# Patient Record
Sex: FEMALE | Race: WHITE | NOT HISPANIC OR LATINO | Employment: FULL TIME | URBAN - METROPOLITAN AREA
[De-identification: names, ages, dates, MRNs, and addresses within clinical notes are randomized per-mention and may not be internally consistent; named-entity substitution may affect disease eponyms.]

---
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
## 2018-01-16 ENCOUNTER — PATIENT MESSAGE (OUTPATIENT)
Dept: PEDIATRICS | Facility: CLINIC | Age: 18
End: 2018-01-16

## 2018-08-17 ENCOUNTER — PATIENT MESSAGE (OUTPATIENT)
Dept: PEDIATRICS | Facility: CLINIC | Age: 18
End: 2018-08-17

## 2019-02-22 ENCOUNTER — OFFICE VISIT (OUTPATIENT)
Dept: OBSTETRICS AND GYNECOLOGY | Facility: CLINIC | Age: 19
End: 2019-02-22
Payer: MEDICAID

## 2019-02-22 ENCOUNTER — TELEPHONE (OUTPATIENT)
Dept: OBSTETRICS AND GYNECOLOGY | Facility: CLINIC | Age: 19
End: 2019-02-22

## 2019-02-22 ENCOUNTER — LAB VISIT (OUTPATIENT)
Dept: LAB | Facility: OTHER | Age: 19
End: 2019-02-22
Attending: OBSTETRICS & GYNECOLOGY
Payer: MEDICAID

## 2019-02-22 ENCOUNTER — PATIENT MESSAGE (OUTPATIENT)
Dept: OBSTETRICS AND GYNECOLOGY | Facility: CLINIC | Age: 19
End: 2019-02-22

## 2019-02-22 VITALS
DIASTOLIC BLOOD PRESSURE: 72 MMHG | WEIGHT: 104.81 LBS | SYSTOLIC BLOOD PRESSURE: 118 MMHG | HEIGHT: 60 IN | BODY MASS INDEX: 20.58 KG/M2

## 2019-02-22 DIAGNOSIS — N89.8 VAGINAL ITCHING: ICD-10-CM

## 2019-02-22 DIAGNOSIS — Z11.3 SCREEN FOR STD (SEXUALLY TRANSMITTED DISEASE): Primary | ICD-10-CM

## 2019-02-22 DIAGNOSIS — Z30.41 USES ORAL CONTRACEPTION: ICD-10-CM

## 2019-02-22 DIAGNOSIS — Z11.3 SCREEN FOR STD (SEXUALLY TRANSMITTED DISEASE): ICD-10-CM

## 2019-02-22 LAB
C TRACH DNA SPEC QL NAA+PROBE: NOT DETECTED
N GONORRHOEA DNA SPEC QL NAA+PROBE: NOT DETECTED

## 2019-02-22 PROCEDURE — 87510 GARDNER VAG DNA DIR PROBE: CPT

## 2019-02-22 PROCEDURE — 36415 COLL VENOUS BLD VENIPUNCTURE: CPT

## 2019-02-22 PROCEDURE — 99395 PR PREVENTIVE VISIT,EST,18-39: ICD-10-PCS | Mod: S$PBB,,, | Performed by: OBSTETRICS & GYNECOLOGY

## 2019-02-22 PROCEDURE — 99212 OFFICE O/P EST SF 10 MIN: CPT | Mod: PBBFAC | Performed by: OBSTETRICS & GYNECOLOGY

## 2019-02-22 PROCEDURE — 99999 PR PBB SHADOW E&M-EST. PATIENT-LVL II: CPT | Mod: PBBFAC,,, | Performed by: OBSTETRICS & GYNECOLOGY

## 2019-02-22 PROCEDURE — 87491 CHLMYD TRACH DNA AMP PROBE: CPT

## 2019-02-22 PROCEDURE — 87340 HEPATITIS B SURFACE AG IA: CPT

## 2019-02-22 PROCEDURE — 87480 CANDIDA DNA DIR PROBE: CPT

## 2019-02-22 PROCEDURE — 86592 SYPHILIS TEST NON-TREP QUAL: CPT

## 2019-02-22 PROCEDURE — 86703 HIV-1/HIV-2 1 RESULT ANTBDY: CPT

## 2019-02-22 PROCEDURE — 99395 PREV VISIT EST AGE 18-39: CPT | Mod: S$PBB,,, | Performed by: OBSTETRICS & GYNECOLOGY

## 2019-02-22 PROCEDURE — 99999 PR PBB SHADOW E&M-EST. PATIENT-LVL II: ICD-10-PCS | Mod: PBBFAC,,, | Performed by: OBSTETRICS & GYNECOLOGY

## 2019-02-22 RX ORDER — NORGESTIMATE AND ETHINYL ESTRADIOL 7DAYSX3 LO
1 KIT ORAL DAILY
Qty: 28 TABLET | Refills: 11 | Status: SHIPPED | OUTPATIENT
Start: 2019-02-22 | End: 2020-03-02 | Stop reason: SDUPTHER

## 2019-02-22 NOTE — PROGRESS NOTES
Chief Complaint: Well Woman Exam     HPI:      Rina Keene is a 18 y.o.  who presents today for well woman exam.  LMP: Patient's last menstrual period was 2019 (approximate).  Having some vaginal pruritis over the past few weeks. Treated with Monistat and symptoms improved but did not resolve. Denies abnormal discharge but vagina sometimes feels dry. She reports that when sexually active she sometimes felt a tearing sensation in her posterior vagina near where her tear had been. Weaned baby in December. Specifically, patient denies abnormal vaginal bleeding, discharge, urinary problems, or changes in appetite. Ms. Keene is not currently sexually active but until 2 months ago she was sexually active with a single male partner.  She is currently using abstinence for contraception but was on progesterone only OCPs. She would like STD screening today.    Ms. Keene confirms that she wears her seatbelt when riding in the car and does not text while driving.     OB History      Para Term  AB Living    1 1 1 0 0 1    SAB TAB Ectopic Multiple Live Births    0 0 0 0 1        Obstetric Comments    Menarche ~10          ROS:     GENERAL: Denies unintentional weight gain or weight loss. Feeling well overall.   SKIN: Denies rash or lesions.   HEENT: Denies headaches, or vision changes.   CARDIOVASCULAR: Denies palpitations or chest pain.   RESPIRATORY: Denies shortness of breath or dyspnea on exertion.  BREASTS: Denies pain, lumps, or nipple discharge.   ABDOMEN: Denies abdominal pain, constipation, diarrhea, nausea, vomiting, change in appetite.  URINARY: Denies frequency, dysuria, hematuria.  NEUROLOGIC: Denies syncope or weakness.   PSYCHIATRIC: Denies depression, anxiety or mood swings.    Physical Exam:      PHYSICAL EXAM:  /72   Ht 5' (1.524 m)   Wt 47.6 kg (104 lb 13.3 oz)   LMP 2019 (Approximate)   Breastfeeding? No   BMI 20.47 kg/m²   Body mass index is 20.47  kg/m².     APPEARANCE: Well nourished, well developed, in no acute distress.  PSYCH: Appropriate mood and affect.  SKIN: No acne or hirsutism  NECK: Neck symmetric without masses or thyromegaly  NODES: No inguinal, axillary, or supraclavicular lymph node enlargement  ABDOMEN: Soft.  No tenderness or masses.    CARDIOVASCULAR: No edema of peripheral extremities  BREASTS: Symmetrical, no skin changes or visible lesions.  No palpable masses or nipple discharge bilaterally.  PELVIC: Normal external genitalia without lesions.  Normal hair distribution.  Adequate perineal body, normal urethral meatus.  Vagina moist and well rugated without lesions or discharge.  Cervix pink, without lesions, discharge or tenderness.  No significant cystocele or rectocele.  Bimanual exam shows uterus to be normal size, regular, mobile and nontender.  Adnexa without masses or tenderness.        Assessment/Plan:     Screen for STD (sexually transmitted disease)  -     C. trachomatis/N. gonorrhoeae by AMP DNA  -     Hepatitis B surface antigen; Future; Expected date: 02/22/2019  -     HIV 1/2 Ag/Ab (4th Gen); Future; Expected date: 02/22/2019  -     RPR; Future; Expected date: 02/22/2019    Uses oral contraception  -     norgestimate-ethinyl estradiol (ORTHO TRI-CYCLEN LO) 0.18/0.215/0.25 mg-25 mcg tablet; Take 1 tablet by mouth once daily.  Dispense: 28 tablet; Refill: 11    Vaginal itching  -     Vaginosis Screen by DNA Probe      Counseling:     Patient was counseled today on current ASCCP pap guidelines, the recommendation for yearly pelvic exams, healthy diet and exercise routines, breast self awareness.She is to see her PCP for other health maintenance.     Use of the [employer] Patient Portal discussed and encouraged during today's visit.

## 2019-02-22 NOTE — TELEPHONE ENCOUNTER
Pt returned 's phone call, she said she can't come back to the office to get the paperwork and would like to know if we can upload it to her portal.

## 2019-02-25 LAB
BACTERIAL VAGINOSIS DNA: NEGATIVE
CANDIDA GLABRATA DNA: NEGATIVE
CANDIDA KRUSEI DNA: NEGATIVE
CANDIDA RRNA VAG QL PROBE: POSITIVE
G VAGINALIS RRNA GENITAL QL PROBE: ABNORMAL
HBV SURFACE AG SERPL QL IA: NEGATIVE
HIV 1+2 AB+HIV1 P24 AG SERPL QL IA: NEGATIVE
RPR SER QL: NORMAL
T VAGINALIS RRNA GENITAL QL PROBE: NEGATIVE

## 2019-02-25 RX ORDER — FLUCONAZOLE 150 MG/1
150 TABLET ORAL ONCE
Qty: 2 TABLET | Refills: 1 | Status: SHIPPED | OUTPATIENT
Start: 2019-02-25 | End: 2019-02-25

## 2019-07-07 ENCOUNTER — HOSPITAL ENCOUNTER (EMERGENCY)
Facility: HOSPITAL | Age: 19
Discharge: HOME | End: 2019-07-07
Attending: EMERGENCY MEDICINE
Payer: MEDICAID

## 2019-07-07 VITALS
HEART RATE: 102 BPM | TEMPERATURE: 100 F | SYSTOLIC BLOOD PRESSURE: 125 MMHG | RESPIRATION RATE: 16 BRPM | DIASTOLIC BLOOD PRESSURE: 63 MMHG | HEIGHT: 59 IN | BODY MASS INDEX: 23.18 KG/M2 | OXYGEN SATURATION: 97 % | WEIGHT: 115 LBS

## 2019-07-07 DIAGNOSIS — M62.838 MUSCLE SPASMS OF NECK: ICD-10-CM

## 2019-07-07 DIAGNOSIS — M54.50 ACUTE LEFT-SIDED LOW BACK PAIN WITHOUT SCIATICA: Primary | ICD-10-CM

## 2019-07-07 DIAGNOSIS — M54.89 BACK PAIN WITHOUT SCIATICA: ICD-10-CM

## 2019-07-07 DIAGNOSIS — M62.830 BACK MUSCLE SPASM: ICD-10-CM

## 2019-07-07 DIAGNOSIS — T14.8XXA MUSCLE STRAIN: ICD-10-CM

## 2019-07-07 LAB
B-HCG UR QL: NEGATIVE
CTP QC/QA: YES

## 2019-07-07 PROCEDURE — 99284 EMERGENCY DEPT VISIT MOD MDM: CPT | Mod: ,,, | Performed by: EMERGENCY MEDICINE

## 2019-07-07 PROCEDURE — 99283 EMERGENCY DEPT VISIT LOW MDM: CPT | Mod: 25

## 2019-07-07 PROCEDURE — 81025 URINE PREGNANCY TEST: CPT | Performed by: EMERGENCY MEDICINE

## 2019-07-07 PROCEDURE — 99284 PR EMERGENCY DEPT VISIT,LEVEL IV: ICD-10-PCS | Mod: ,,, | Performed by: EMERGENCY MEDICINE

## 2019-07-07 PROCEDURE — 25000003 PHARM REV CODE 250: Performed by: EMERGENCY MEDICINE

## 2019-07-07 RX ORDER — BACLOFEN 20 MG/1
20 TABLET ORAL 3 TIMES DAILY
Qty: 9 TABLET | Refills: 0 | Status: SHIPPED | OUTPATIENT
Start: 2019-07-07 | End: 2020-03-02

## 2019-07-07 RX ORDER — MELOXICAM 7.5 MG/1
15 TABLET ORAL DAILY
Status: DISCONTINUED | OUTPATIENT
Start: 2019-07-07 | End: 2019-07-07 | Stop reason: HOSPADM

## 2019-07-07 RX ORDER — BACLOFEN 10 MG/1
20 TABLET ORAL ONCE
Status: COMPLETED | OUTPATIENT
Start: 2019-07-07 | End: 2019-07-07

## 2019-07-07 RX ORDER — MELOXICAM 7.5 MG/1
7.5 TABLET ORAL 2 TIMES DAILY PRN
Qty: 6 TABLET | Refills: 0 | Status: SHIPPED | OUTPATIENT
Start: 2019-07-07 | End: 2019-07-10

## 2019-07-07 RX ADMIN — BACLOFEN 20 MG: 10 TABLET ORAL at 09:07

## 2019-07-07 RX ADMIN — MELOXICAM 15 MG: 7.5 TABLET ORAL at 09:07

## 2019-07-07 NOTE — ED PROVIDER NOTES
"Encounter Date: 2019       History     Chief Complaint   Patient presents with    Back Pain     Book shelves loaded with books fell on her while sleeping 3 days ago.      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 She denies any numbness or weakness to upper or lower extremities. No urinary retention, urinary strength or urinary stream caliper changes. No reported perianal sensory deficits upon post-defecation wiping; no Hx of IVDU or perispinal surgeries; no Hx of trauma or bleeding disorders. She reports pain is aggravated with movement and alleviated with rest. No ROM deficits to B/L UE or LE and ambulating at baseline. Otherwise, no recent illness or other complaints.    Review of patient's allergies indicates:   Allergen Reactions    Iodine and iodide containing products Other (See Comments)     "Mom was allergic"      Past Medical History:   Diagnosis Date    Asthma, currently inactive     seasonal    Scoliosis      Past Surgical History:   Procedure Laterality Date    VAGINAL DELIVERY  2017    Boy    WISDOM TOOTH EXTRACTION       Family History   Problem Relation Age of Onset    Miscarriages / Stillbirths Mother     Miscarriages / Stillbirths Maternal Grandmother     Diabetes Maternal Grandfather     Stroke Maternal Grandfather     Hypertension Maternal Grandfather     Miscarriages / Stillbirths Maternal Aunt     Breast cancer Neg Hx     Colon cancer Neg Hx     Ovarian cancer Neg Hx      labor Neg Hx     Cancer Neg Hx      Social History     Tobacco Use    Smoking status: Former Smoker     Types: Cigarettes    Smokeless tobacco: Never Used   Substance Use Topics    Alcohol use: Yes     Comment: social     Drug use: No     Review of Systems  HEENT: No HA, eye pain or changes in vision, sore " throat, ear pain, otorrhea,  rhinorrhea, tooth pain, swelling, or voice changes.  NECK: No pain, masses, trauma, or redness.  HEART: No pain, palpitations, diaphoresis, nausea, or vomiting.  LUNG: No wheezes, cough, SOB, BAE or other complaints.  ABDOMEN: No pain, nausea, vomiting, diarrhea, constipation, or flank pain.  BACK: + Back pain  : No discharge, dysuria, lesions, rashes, masses, sores or retention.  EXTREMITIES: FROM and No swelling, redness, trauma/injuries, lesions, sores,  weakness, numbness, or tingling.  NEURO: No dizziness, weakness, fatigue, tremors, headache, change in vision or  disturbances of balance or coordination.  SKIN: No lesions, rashes, trauma/injuries or other abnormality.    Physical Exam     Initial Vitals [07/07/19 0848]   BP Pulse Resp Temp SpO2   125/63 102 16 99.7 °F (37.6 °C) 97 %      MAP       --         Physical Exam  GENERAL: Well-appearing and Non-Toxic; Well-Nourished; Speaking Full sentences and in mild distress 2/2 pain.  HEENT: AT/NC.  NECK: Supple, FROM, no accessory muscle use.  HEART: Regular rate and rhythm, no M/G/T.  LUNGS: No Tachypnea, No Retractions, and CTA B/L with no W/R/R.  ABDOMEN: +BS, Soft, ND, NTTP.  BACK: Atraumatic, No midline TTP to C/T/LS spine with + TTP over LEFT LS paravertebral muscle eliciting/reproducing described pain; No CVA tenderness B/L. SLRT NEG.  SKIN: No paresthesia to light tough, asymmetric warmth, rash to thorax/flank.  EXTREMITIES: FROM. No deformities, Soft compartments with 2+ pulses Prox/Dist Intact & Symm.  NEUROLOGIC: AAOx3, Answering Questions Appropriately, CN/PN Intact, Strength 5/5 to UE & LE Prox &amp; Distally, No Saddle Anesthesia; Sensation Symmetrical to UE & LE, No Ataxia.    ED Course   Procedures  Labs Reviewed   POCT URINE PREGNANCY          Imaging Results    None          Medical Decision Making:   History:   Old Medical Records: I decided to obtain old medical records.  Initial Assessment:   Afebrile,  atraumatic and hemodynamically stable female with no neurovascular deficits or red flags warranting emergent MRI.  Patient with history of scoliosis here with signs and symptoms consistent with muscle spasms/likely secondary to muscle strain from heavy lifting at work.  Will provide analgesia and muscle relaxants.  Discussed extensively to not operate motor vehicle or perform any task while using muscle relaxants, which she acknowledged.  Additionally discussed symptoms warranting ED return, which she understood.  ____________________  Javier Redding MD, [facility]  Emergency Medicine Staff  9:21 AM 7/7/2019                        Clinical Impression:       ICD-10-CM ICD-9-CM   1. Acute left-sided low back pain without sciatica M54.5 724.2   2. Muscle strain T14.8XXA 848.9   3. Back pain without sciatica M54.89 724.5   4. Muscle spasms of neck M62.838 728.85   5. Back muscle spasm M62.830 724.8                                Trino Redding MD  07/07/19 0927

## 2019-07-07 NOTE — ED TRIAGE NOTES
Book shelf w/ 200 books fell onto her head and  Body  On 7/4/2019.. C/O neck and back pain . Has a fuzzy feeling in her head w/ chills and a tingly feeling in her temples and neck.  Sweating a lot since incident and having chills.  Denies dysuria. No vision changes.  Feels nauseated but no emesis. Denies numbness/ tingling in extremities.

## 2019-07-07 NOTE — ED NOTES
LOC: The patient is awake and alert; oriented x 3 and speaking appropriately.  APPEARANCE: Patient resting comfortably, patient is clean and well groomed  SKIN: warm and dry, normal skin turgor & moist mucus membranes, skin intact, no breakdown noted.  MUSCULOSKELETAL: Patient moving all extremities well, no obvious swelling or deformities noted, c/o back pain and neck pain - denies tingling / numbness in extremities.  RESPIRATORY: Airway is open and patent,  respirations are spontaneous, normal effort and rate  CARDIAC: Patient has a normal rate, no peripheral edema noted, capillary refill < 3 seconds; No complaints of chest pain   ABDOMEN: Soft and non tender to palpation, no distention noted. Bowel sounds present x 4

## 2019-08-21 ENCOUNTER — PATIENT MESSAGE (OUTPATIENT)
Dept: PEDIATRICS | Facility: CLINIC | Age: 19
End: 2019-08-21

## 2020-02-11 ENCOUNTER — TELEPHONE (OUTPATIENT)
Dept: OBSTETRICS AND GYNECOLOGY | Facility: CLINIC | Age: 20
End: 2020-02-11

## 2020-02-11 ENCOUNTER — OFFICE VISIT (OUTPATIENT)
Dept: OBSTETRICS AND GYNECOLOGY | Facility: CLINIC | Age: 20
End: 2020-02-11
Payer: MEDICAID

## 2020-02-11 VITALS
WEIGHT: 113.13 LBS | DIASTOLIC BLOOD PRESSURE: 64 MMHG | SYSTOLIC BLOOD PRESSURE: 102 MMHG | BODY MASS INDEX: 22.8 KG/M2 | HEIGHT: 59 IN

## 2020-02-11 DIAGNOSIS — N90.89 VULVAR IRRITATION: Primary | ICD-10-CM

## 2020-02-11 LAB
BACTERIAL VAGINOSIS DNA: NEGATIVE
CANDIDA GLABRATA DNA: NEGATIVE
CANDIDA KRUSEI DNA: NEGATIVE
CANDIDA RRNA VAG QL PROBE: POSITIVE
T VAGINALIS RRNA GENITAL QL PROBE: NEGATIVE

## 2020-02-11 PROCEDURE — 99213 OFFICE O/P EST LOW 20 MIN: CPT | Mod: PBBFAC | Performed by: OBSTETRICS & GYNECOLOGY

## 2020-02-11 PROCEDURE — 99213 PR OFFICE/OUTPT VISIT, EST, LEVL III, 20-29 MIN: ICD-10-PCS | Mod: S$PBB,,, | Performed by: OBSTETRICS & GYNECOLOGY

## 2020-02-11 PROCEDURE — 87481 CANDIDA DNA AMP PROBE: CPT | Mod: 59

## 2020-02-11 PROCEDURE — 99999 PR PBB SHADOW E&M-EST. PATIENT-LVL III: CPT | Mod: PBBFAC,,, | Performed by: OBSTETRICS & GYNECOLOGY

## 2020-02-11 PROCEDURE — 87529 HSV DNA AMP PROBE: CPT | Mod: 59

## 2020-02-11 PROCEDURE — 99213 OFFICE O/P EST LOW 20 MIN: CPT | Mod: S$PBB,,, | Performed by: OBSTETRICS & GYNECOLOGY

## 2020-02-11 PROCEDURE — 99999 PR PBB SHADOW E&M-EST. PATIENT-LVL III: ICD-10-PCS | Mod: PBBFAC,,, | Performed by: OBSTETRICS & GYNECOLOGY

## 2020-02-11 PROCEDURE — 87661 TRICHOMONAS VAGINALIS AMPLIF: CPT

## 2020-02-11 RX ORDER — FLUCONAZOLE 150 MG/1
150 TABLET ORAL ONCE
Qty: 1 TABLET | Refills: 0 | Status: SHIPPED | OUTPATIENT
Start: 2020-02-11 | End: 2020-02-11

## 2020-02-11 NOTE — TELEPHONE ENCOUNTER
Dr. Perez pt called saying that she feels like she is having vaginal swelling and it itches when she moves. Please advise. Thank you.

## 2020-02-11 NOTE — PROGRESS NOTES
"  Chief Complaint: Vaginitis     HPI:      Rina Keene is a 19 y.o.  who presents with complaint of external genital dryness and swelling for 2 days.  She reports very mild, rare itching, denies odor.  She denies abnormal discharge.  She is currently sexually active with a single male partner. She is using oral contraceptives (estrogen/progesterone) for contraception. She has not experienced symptoms like this before. Patient does not douche. Patient's last menstrual period was 2020 (exact date).     Past Medical History:   Diagnosis Date    Asthma, currently inactive     seasonal    Scoliosis        ROS:     GENERAL: Denies weight gain or weight loss. Feeling well overall.   ABDOMEN: Denies abdominal pain, constipation, diarrhea, nausea, vomiting, change in appetite.   URINARY: Denies frequency, dysuria, hematuria.    Physical Exam:      PHYSICAL EXAM:   Vitals:    20 0945   BP: 102/64   Weight: 51.3 kg (113 lb 1.5 oz)   Height: 4' 11" (1.499 m)   PainSc: 0-No pain     Body mass index is 22.84 kg/m².    General: No acute distress, alert and engaged  Pelvic: External genitalia and urethra within normal limits. Perineum and nancy-anal area with very small (1mm) raised bumps. When rubbed with an HSV swab the skin is very dry and peels. Small amount of erythema in the area.   Vagina without lesions, without discharge, without erythema, without ulcers.    Assessment/Plan:     Vulvar irritation  -     Vaginosis Screen by DNA Probe  -     HSV by Rapid PCR, Non-Blood [facility]; Vagina; Future; Expected date: 2020    Other orders  -     fluconazole (DIFLUCAN) 150 MG Tab; Take 1 tablet (150 mg total) by mouth once. for 1 dose  Dispense: 1 tablet; Refill: 0    Recommended aquaphor BID until area healed    Will call patient with vaginal swab results.  Follow up: PRN if no improvement.      Counseling:     Patient was counseled today on vaginitis prevention, including to:  1. Avoid feminine " products such as deodorant soaps, body wash, bubble bath, douches, scented toilet paper, deodorant tampons or pads, feminine wipes, chronic pad use, etc.  2.  Avoid other vulvovaginal irritants such as long hot baths, humidity, tight, synthetic clothing, chlorine and sitting around in wet bathing suits  3. Wear cotton underwear.  4. Shower immediately after exercise and change clothes  5. She was encouraged not to douche       Use of the [email or web address] Patient Portal discussed and encouraged during today's visit.

## 2020-02-11 NOTE — TELEPHONE ENCOUNTER
Pt states she has swelling and intermittent itching but it doesn't feel like a normal yeast infection.  Scheduled today with Dr. Perez.

## 2020-02-12 LAB
HSV1 DNA SPEC QL NAA+PROBE: NEGATIVE
HSV2 DNA SPEC QL NAA+PROBE: NEGATIVE
SPECIMEN SOURCE: NORMAL

## 2020-03-02 ENCOUNTER — OFFICE VISIT (OUTPATIENT)
Dept: OBSTETRICS AND GYNECOLOGY | Facility: CLINIC | Age: 20
End: 2020-03-02
Payer: MEDICAID

## 2020-03-02 VITALS
DIASTOLIC BLOOD PRESSURE: 60 MMHG | WEIGHT: 117.81 LBS | SYSTOLIC BLOOD PRESSURE: 100 MMHG | HEIGHT: 60 IN | BODY MASS INDEX: 23.13 KG/M2

## 2020-03-02 DIAGNOSIS — Z30.41 ORAL CONTRACEPTIVE PILL SURVEILLANCE: ICD-10-CM

## 2020-03-02 DIAGNOSIS — Z12.4 SCREENING FOR CERVICAL CANCER: Primary | ICD-10-CM

## 2020-03-02 PROCEDURE — 99212 OFFICE O/P EST SF 10 MIN: CPT | Mod: PBBFAC,PN | Performed by: OBSTETRICS & GYNECOLOGY

## 2020-03-02 PROCEDURE — 99395 PREV VISIT EST AGE 18-39: CPT | Mod: S$PBB,,, | Performed by: OBSTETRICS & GYNECOLOGY

## 2020-03-02 PROCEDURE — 99999 PR PBB SHADOW E&M-EST. PATIENT-LVL II: CPT | Mod: PBBFAC,,, | Performed by: OBSTETRICS & GYNECOLOGY

## 2020-03-02 PROCEDURE — 88175 CYTOPATH C/V AUTO FLUID REDO: CPT

## 2020-03-02 PROCEDURE — 99999 PR PBB SHADOW E&M-EST. PATIENT-LVL II: ICD-10-PCS | Mod: PBBFAC,,, | Performed by: OBSTETRICS & GYNECOLOGY

## 2020-03-02 PROCEDURE — 99395 PR PREVENTIVE VISIT,EST,18-39: ICD-10-PCS | Mod: S$PBB,,, | Performed by: OBSTETRICS & GYNECOLOGY

## 2020-03-02 RX ORDER — NORGESTIMATE AND ETHINYL ESTRADIOL 7DAYSX3 LO
1 KIT ORAL DAILY
Qty: 84 TABLET | Refills: 3 | Status: SHIPPED | OUTPATIENT
Start: 2020-03-02 | End: 2021-04-30 | Stop reason: SDUPTHER

## 2020-03-02 NOTE — PROGRESS NOTES
Chief Complaint: Well Woman Exam     HPI:      Rina Keene is a 19 y.o.  who presents today for well woman exam.  LMP: Patient's last menstrual period was 2020.  No issues, problems, or complaints. Specifically, patient denies abnormal vaginal bleeding, discharge, pelvic pain, urinary problems, or changes in appetite. Ms. Keene is currently sexually active with a single male partner. She is currently using oral contraceptives (estrogen/progesterone) for contraception. She declines STD screening today.    Previous Pap:  Has never had one     Gardasil:Completed     Ms. Keene confirms that she wears her seatbelt when riding in the car and does not text while driving.     OB History        1    Para   1    Term   1       0    AB   0    Living   1       SAB   0    TAB   0    Ectopic   0    Multiple   0    Live Births   1           Obstetric Comments   Menarche ~10             ROS:     GENERAL: Denies unintentional weight gain or weight loss. Feeling well overall.   SKIN: Denies rash or lesions.   HEENT: Denies headaches, or vision changes.   CARDIOVASCULAR: Denies palpitations or chest pain.   RESPIRATORY: Denies shortness of breath or dyspnea on exertion.  BREASTS: Denies pain, lumps, or nipple discharge.   ABDOMEN: Denies abdominal pain, constipation, diarrhea, nausea, vomiting, change in appetite.  URINARY: Denies frequency, dysuria, hematuria.  NEUROLOGIC: Denies syncope or weakness.   PSYCHIATRIC: Denies depression, anxiety or mood swings.    Physical Exam:      PHYSICAL EXAM:  /60   Ht 5' (1.524 m)   Wt 53.4 kg (117 lb 13.4 oz)   LMP 2020   BMI 23.01 kg/m²   Body mass index is 23.01 kg/m².     APPEARANCE: Well nourished, well developed, in no acute distress.  PSYCH: Appropriate mood and affect.  SKIN: No acne or hirsutism  NECK: Neck symmetric without masses or thyromegaly  NODES: No inguinal, axillary, or supraclavicular lymph node enlargement  ABDOMEN:  Soft.  No tenderness or masses.    CARDIOVASCULAR: No edema of peripheral extremities  BREASTS: Symmetrical, no skin changes or visible lesions.  No palpable masses or nipple discharge bilaterally.  PELVIC: Normal external genitalia without lesions.  Normal hair distribution.  Adequate perineal body, normal urethral meatus.  Vagina moist and well rugated without lesions or discharge.  Cervix pink, without lesions, discharge or tenderness.  No significant cystocele or rectocele.  Bimanual exam shows uterus to be normal size, regular, mobile and nontender.  Adnexa without masses or tenderness.      Assessment/Plan:     Screening for cervical cancer  -     Liquid-Based Pap Smear, Screening    Oral contraceptive pill surveillance  -     norgestimate-ethinyl estradiol (ORTHO TRI-CYCLEN LO) 0.18/0.215/0.25 mg-25 mcg tablet; Take 1 tablet by mouth once daily.  Dispense: 84 tablet; Refill: 3        Counseling:     Patient was counseled today on current ASCCP pap guidelines, the recommendation for yearly pelvic exams, healthy diet and exercise routines, breast self awareness.She is to see her PCP for other health maintenance.     Use of the [facility] Patient Portal discussed and encouraged during today's visit.

## 2020-03-28 LAB
FINAL PATHOLOGIC DIAGNOSIS: NORMAL
Lab: NORMAL

## 2021-02-26 ENCOUNTER — OFFICE VISIT (OUTPATIENT)
Dept: URGENT CARE | Facility: CLINIC | Age: 21
End: 2021-02-26
Payer: MEDICAID

## 2021-02-26 VITALS
TEMPERATURE: 99 F | RESPIRATION RATE: 16 BRPM | DIASTOLIC BLOOD PRESSURE: 75 MMHG | HEART RATE: 97 BPM | WEIGHT: 115 LBS | SYSTOLIC BLOOD PRESSURE: 115 MMHG | HEIGHT: 60 IN | OXYGEN SATURATION: 96 % | BODY MASS INDEX: 22.58 KG/M2

## 2021-02-26 DIAGNOSIS — J02.9 ACUTE PHARYNGITIS, UNSPECIFIED ETIOLOGY: Primary | ICD-10-CM

## 2021-02-26 DIAGNOSIS — J02.9 SORE THROAT: ICD-10-CM

## 2021-02-26 LAB
CTP QC/QA: YES
CTP QC/QA: YES
MOLECULAR STREP A: NEGATIVE
SARS-COV-2 RDRP RESP QL NAA+PROBE: NEGATIVE

## 2021-02-26 PROCEDURE — U0002: ICD-10-PCS | Mod: QW,S$GLB,, | Performed by: NURSE PRACTITIONER

## 2021-02-26 PROCEDURE — 99203 OFFICE O/P NEW LOW 30 MIN: CPT | Mod: S$GLB,,, | Performed by: NURSE PRACTITIONER

## 2021-02-26 PROCEDURE — U0002 COVID-19 LAB TEST NON-CDC: HCPCS | Mod: QW,S$GLB,, | Performed by: NURSE PRACTITIONER

## 2021-02-26 PROCEDURE — 99203 PR OFFICE/OUTPT VISIT, NEW, LEVL III, 30-44 MIN: ICD-10-PCS | Mod: S$GLB,,, | Performed by: NURSE PRACTITIONER

## 2021-02-26 PROCEDURE — 87651 STREP A DNA AMP PROBE: CPT | Mod: QW,S$GLB,, | Performed by: NURSE PRACTITIONER

## 2021-02-26 PROCEDURE — 87651 POCT STREP A MOLECULAR: ICD-10-PCS | Mod: QW,S$GLB,, | Performed by: NURSE PRACTITIONER

## 2021-02-26 RX ORDER — PREDNISONE 20 MG/1
20 TABLET ORAL DAILY
Qty: 3 TABLET | Refills: 0 | Status: SHIPPED | OUTPATIENT
Start: 2021-02-26 | End: 2021-02-26 | Stop reason: CLARIF

## 2021-02-28 ENCOUNTER — OFFICE VISIT (OUTPATIENT)
Dept: URGENT CARE | Facility: CLINIC | Age: 21
End: 2021-02-28
Payer: MEDICAID

## 2021-02-28 VITALS
DIASTOLIC BLOOD PRESSURE: 57 MMHG | BODY MASS INDEX: 22.58 KG/M2 | OXYGEN SATURATION: 98 % | RESPIRATION RATE: 16 BRPM | HEIGHT: 60 IN | WEIGHT: 115 LBS | SYSTOLIC BLOOD PRESSURE: 94 MMHG | TEMPERATURE: 98 F | HEART RATE: 92 BPM

## 2021-02-28 DIAGNOSIS — J02.9 ACUTE PHARYNGITIS, UNSPECIFIED ETIOLOGY: Primary | ICD-10-CM

## 2021-02-28 DIAGNOSIS — J06.9 URI, ACUTE: ICD-10-CM

## 2021-02-28 LAB
CTP QC/QA: YES
CTP QC/QA: YES
HETEROPH AB SER QL: NEGATIVE
MOLECULAR STREP A: NEGATIVE

## 2021-02-28 PROCEDURE — 86308 POCT INFECTIOUS MONONUCLEOSIS: ICD-10-PCS | Mod: QW,S$GLB,, | Performed by: FAMILY MEDICINE

## 2021-02-28 PROCEDURE — 86308 HETEROPHILE ANTIBODY SCREEN: CPT | Mod: QW,S$GLB,, | Performed by: FAMILY MEDICINE

## 2021-02-28 PROCEDURE — 99213 PR OFFICE/OUTPT VISIT, EST, LEVL III, 20-29 MIN: ICD-10-PCS | Mod: S$GLB,,, | Performed by: FAMILY MEDICINE

## 2021-02-28 PROCEDURE — 99213 OFFICE O/P EST LOW 20 MIN: CPT | Mod: S$GLB,,, | Performed by: FAMILY MEDICINE

## 2021-02-28 PROCEDURE — 87651 POCT STREP A MOLECULAR: ICD-10-PCS | Mod: QW,S$GLB,, | Performed by: FAMILY MEDICINE

## 2021-02-28 PROCEDURE — 87651 STREP A DNA AMP PROBE: CPT | Mod: QW,S$GLB,, | Performed by: FAMILY MEDICINE

## 2021-02-28 RX ORDER — LORATADINE 10 MG/1
10 TABLET ORAL DAILY
Qty: 30 TABLET | Refills: 2 | Status: SHIPPED | OUTPATIENT
Start: 2021-02-28 | End: 2022-10-07

## 2021-02-28 RX ORDER — AMOXICILLIN 875 MG/1
875 TABLET, FILM COATED ORAL 2 TIMES DAILY
Qty: 20 TABLET | Refills: 0 | Status: SHIPPED | OUTPATIENT
Start: 2021-02-28 | End: 2021-03-10

## 2021-02-28 RX ORDER — PREDNISONE 20 MG/1
TABLET ORAL
COMMUNITY
Start: 2021-02-27 | End: 2021-04-30

## 2021-04-30 ENCOUNTER — OFFICE VISIT (OUTPATIENT)
Dept: OBSTETRICS AND GYNECOLOGY | Facility: CLINIC | Age: 21
End: 2021-04-30
Attending: OBSTETRICS & GYNECOLOGY
Payer: MEDICAID

## 2021-04-30 VITALS
BODY MASS INDEX: 22.58 KG/M2 | SYSTOLIC BLOOD PRESSURE: 98 MMHG | HEIGHT: 60 IN | WEIGHT: 115 LBS | DIASTOLIC BLOOD PRESSURE: 60 MMHG

## 2021-04-30 DIAGNOSIS — Z01.419 ENCOUNTER FOR ANNUAL ROUTINE GYNECOLOGICAL EXAMINATION: Primary | ICD-10-CM

## 2021-04-30 DIAGNOSIS — Z30.41 ORAL CONTRACEPTIVE PILL SURVEILLANCE: ICD-10-CM

## 2021-04-30 DIAGNOSIS — Z11.3 SCREEN FOR STD (SEXUALLY TRANSMITTED DISEASE): ICD-10-CM

## 2021-04-30 PROCEDURE — 99395 PR PREVENTIVE VISIT,EST,18-39: ICD-10-PCS | Mod: S$PBB,,, | Performed by: OBSTETRICS & GYNECOLOGY

## 2021-04-30 PROCEDURE — 99999 PR PBB SHADOW E&M-EST. PATIENT-LVL III: CPT | Mod: PBBFAC,,, | Performed by: OBSTETRICS & GYNECOLOGY

## 2021-04-30 PROCEDURE — 99213 OFFICE O/P EST LOW 20 MIN: CPT | Mod: PBBFAC | Performed by: OBSTETRICS & GYNECOLOGY

## 2021-04-30 PROCEDURE — 99999 PR PBB SHADOW E&M-EST. PATIENT-LVL III: ICD-10-PCS | Mod: PBBFAC,,, | Performed by: OBSTETRICS & GYNECOLOGY

## 2021-04-30 PROCEDURE — 99395 PREV VISIT EST AGE 18-39: CPT | Mod: S$PBB,,, | Performed by: OBSTETRICS & GYNECOLOGY

## 2021-04-30 RX ORDER — NORGESTIMATE AND ETHINYL ESTRADIOL 7DAYSX3 LO
1 KIT ORAL DAILY
Qty: 84 TABLET | Refills: 3 | Status: SHIPPED | OUTPATIENT
Start: 2021-04-30 | End: 2022-11-29

## 2021-04-30 RX ORDER — FLUTICASONE PROPIONATE 50 MCG
2 SPRAY, SUSPENSION (ML) NASAL
COMMUNITY
Start: 2021-03-31

## 2021-05-03 LAB
C TRACH RRNA SPEC QL NAA+PROBE: NEGATIVE
N GONORRHOEA RRNA SPEC QL NAA+PROBE: NEGATIVE

## 2021-08-02 ENCOUNTER — HOSPITAL ENCOUNTER (EMERGENCY)
Facility: HOSPITAL | Age: 21
Discharge: HOME | End: 2021-08-02
Attending: EMERGENCY MEDICINE
Payer: MEDICAID

## 2021-08-02 ENCOUNTER — TELEPHONE (OUTPATIENT)
Dept: OBSTETRICS AND GYNECOLOGY | Facility: CLINIC | Age: 21
End: 2021-08-02

## 2021-08-02 VITALS
RESPIRATION RATE: 14 BRPM | SYSTOLIC BLOOD PRESSURE: 110 MMHG | OXYGEN SATURATION: 96 % | TEMPERATURE: 100 F | DIASTOLIC BLOOD PRESSURE: 59 MMHG | HEART RATE: 112 BPM

## 2021-08-02 DIAGNOSIS — U07.1 COVID-19: Primary | ICD-10-CM

## 2021-08-02 LAB
CTP QC/QA: YES
SARS-COV-2 RDRP RESP QL NAA+PROBE: POSITIVE

## 2021-08-02 PROCEDURE — U0002 COVID-19 LAB TEST NON-CDC: HCPCS | Performed by: EMERGENCY MEDICINE

## 2021-08-02 PROCEDURE — 99282 PR EMERGENCY DEPT VISIT,LEVEL II: ICD-10-PCS | Mod: CS,,, | Performed by: EMERGENCY MEDICINE

## 2021-08-02 PROCEDURE — 99283 EMERGENCY DEPT VISIT LOW MDM: CPT | Mod: 25

## 2021-08-02 PROCEDURE — 99282 EMERGENCY DEPT VISIT SF MDM: CPT | Mod: CS,,, | Performed by: EMERGENCY MEDICINE

## 2021-08-02 PROCEDURE — 25000003 PHARM REV CODE 250: Performed by: EMERGENCY MEDICINE

## 2021-08-02 RX ORDER — ACETAMINOPHEN 500 MG
1000 TABLET ORAL
Status: COMPLETED | OUTPATIENT
Start: 2021-08-02 | End: 2021-08-02

## 2021-08-02 RX ADMIN — ACETAMINOPHEN 1000 MG: 500 TABLET ORAL at 09:08

## 2021-08-03 ENCOUNTER — PATIENT MESSAGE (OUTPATIENT)
Dept: ADMINISTRATIVE | Facility: OTHER | Age: 21
End: 2021-08-03

## 2021-08-03 ENCOUNTER — PATIENT MESSAGE (OUTPATIENT)
Dept: ADMINISTRATIVE | Facility: CLINIC | Age: 21
End: 2021-08-03

## 2021-08-03 ENCOUNTER — NURSE TRIAGE (OUTPATIENT)
Dept: ADMINISTRATIVE | Facility: CLINIC | Age: 21
End: 2021-08-03

## 2021-08-04 ENCOUNTER — PATIENT MESSAGE (OUTPATIENT)
Dept: ADMINISTRATIVE | Facility: OTHER | Age: 21
End: 2021-08-04

## 2021-08-04 ENCOUNTER — NURSE TRIAGE (OUTPATIENT)
Dept: ADMINISTRATIVE | Facility: CLINIC | Age: 21
End: 2021-08-04

## 2021-08-05 ENCOUNTER — NURSE TRIAGE (OUTPATIENT)
Dept: ADMINISTRATIVE | Facility: CLINIC | Age: 21
End: 2021-08-05

## 2021-08-05 ENCOUNTER — PATIENT MESSAGE (OUTPATIENT)
Dept: ADMINISTRATIVE | Facility: CLINIC | Age: 21
End: 2021-08-05

## 2021-08-18 ENCOUNTER — OFFICE VISIT (OUTPATIENT)
Dept: OBSTETRICS AND GYNECOLOGY | Facility: CLINIC | Age: 21
End: 2021-08-18
Payer: MEDICAID

## 2021-08-18 VITALS
BODY MASS INDEX: 22.27 KG/M2 | HEIGHT: 60 IN | SYSTOLIC BLOOD PRESSURE: 110 MMHG | DIASTOLIC BLOOD PRESSURE: 60 MMHG | WEIGHT: 113.44 LBS

## 2021-08-18 DIAGNOSIS — Z11.3 SCREEN FOR STD (SEXUALLY TRANSMITTED DISEASE): ICD-10-CM

## 2021-08-18 DIAGNOSIS — N90.89 VULVAR IRRITATION: Primary | ICD-10-CM

## 2021-08-18 PROCEDURE — 87491 CHLMYD TRACH DNA AMP PROBE: CPT | Mod: 59 | Performed by: NURSE PRACTITIONER

## 2021-08-18 PROCEDURE — 99213 OFFICE O/P EST LOW 20 MIN: CPT | Mod: PBBFAC,PN | Performed by: NURSE PRACTITIONER

## 2021-08-18 PROCEDURE — 87481 CANDIDA DNA AMP PROBE: CPT | Mod: 59 | Performed by: NURSE PRACTITIONER

## 2021-08-18 PROCEDURE — 99999 PR PBB SHADOW E&M-EST. PATIENT-LVL III: ICD-10-PCS | Mod: PBBFAC,,, | Performed by: NURSE PRACTITIONER

## 2021-08-18 PROCEDURE — 99999 PR PBB SHADOW E&M-EST. PATIENT-LVL III: CPT | Mod: PBBFAC,,, | Performed by: NURSE PRACTITIONER

## 2021-08-18 PROCEDURE — 99213 PR OFFICE/OUTPT VISIT, EST, LEVL III, 20-29 MIN: ICD-10-PCS | Mod: S$PBB,,, | Performed by: NURSE PRACTITIONER

## 2021-08-18 PROCEDURE — 87591 N.GONORRHOEAE DNA AMP PROB: CPT | Performed by: NURSE PRACTITIONER

## 2021-08-18 PROCEDURE — 99213 OFFICE O/P EST LOW 20 MIN: CPT | Mod: S$PBB,,, | Performed by: NURSE PRACTITIONER

## 2021-08-20 ENCOUNTER — PATIENT MESSAGE (OUTPATIENT)
Dept: OBSTETRICS AND GYNECOLOGY | Facility: CLINIC | Age: 21
End: 2021-08-20

## 2021-08-20 LAB
BACTERIAL VAGINOSIS DNA: POSITIVE
C TRACH DNA SPEC QL NAA+PROBE: NOT DETECTED
CANDIDA GLABRATA DNA: NEGATIVE
CANDIDA KRUSEI DNA: NEGATIVE
CANDIDA RRNA VAG QL PROBE: NEGATIVE
N GONORRHOEA DNA SPEC QL NAA+PROBE: NOT DETECTED
T VAGINALIS RRNA GENITAL QL PROBE: NEGATIVE

## 2021-08-23 ENCOUNTER — PATIENT MESSAGE (OUTPATIENT)
Dept: OBSTETRICS AND GYNECOLOGY | Facility: CLINIC | Age: 21
End: 2021-08-23

## 2021-08-23 DIAGNOSIS — B96.89 BACTERIAL VAGINITIS: Primary | ICD-10-CM

## 2021-08-23 DIAGNOSIS — N76.0 BACTERIAL VAGINITIS: Primary | ICD-10-CM

## 2021-08-23 RX ORDER — METRONIDAZOLE 500 MG/1
500 TABLET ORAL 2 TIMES DAILY
Qty: 14 TABLET | Refills: 0 | Status: SHIPPED | OUTPATIENT
Start: 2021-08-23 | End: 2021-08-30

## 2021-08-23 RX ORDER — FLUCONAZOLE 150 MG/1
150 TABLET ORAL DAILY
Qty: 1 TABLET | Refills: 0 | Status: SHIPPED | OUTPATIENT
Start: 2021-08-23 | End: 2021-08-24

## 2022-07-25 ENCOUNTER — PATIENT MESSAGE (OUTPATIENT)
Dept: OBSTETRICS AND GYNECOLOGY | Facility: CLINIC | Age: 22
End: 2022-07-25
Payer: MEDICAID

## 2022-07-25 ENCOUNTER — TELEPHONE (OUTPATIENT)
Dept: OBSTETRICS AND GYNECOLOGY | Facility: CLINIC | Age: 22
End: 2022-07-25
Payer: MEDICAID

## 2022-07-25 DIAGNOSIS — N76.0 ACUTE VAGINITIS: Primary | ICD-10-CM

## 2022-07-25 RX ORDER — FLUCONAZOLE 150 MG/1
150 TABLET ORAL ONCE
Qty: 2 TABLET | Refills: 1 | Status: SHIPPED | OUTPATIENT
Start: 2022-07-25 | End: 2022-07-25

## 2022-08-22 ENCOUNTER — PATIENT MESSAGE (OUTPATIENT)
Dept: OBSTETRICS AND GYNECOLOGY | Facility: CLINIC | Age: 22
End: 2022-08-22
Payer: MEDICAID

## 2022-08-23 ENCOUNTER — OFFICE VISIT (OUTPATIENT)
Dept: OBSTETRICS AND GYNECOLOGY | Facility: CLINIC | Age: 22
End: 2022-08-23
Payer: MEDICAID

## 2022-08-23 VITALS
WEIGHT: 113.56 LBS | DIASTOLIC BLOOD PRESSURE: 60 MMHG | SYSTOLIC BLOOD PRESSURE: 110 MMHG | BODY MASS INDEX: 22.29 KG/M2 | HEIGHT: 60 IN

## 2022-08-23 DIAGNOSIS — N76.0 ACUTE VAGINITIS: Primary | ICD-10-CM

## 2022-08-23 LAB
CANDIDA RRNA VAG QL PROBE: NEGATIVE
G VAGINALIS RRNA GENITAL QL PROBE: POSITIVE
T VAGINALIS RRNA GENITAL QL PROBE: NEGATIVE

## 2022-08-23 PROCEDURE — 99213 PR OFFICE/OUTPT VISIT, EST, LEVL III, 20-29 MIN: ICD-10-PCS | Mod: S$PBB,,, | Performed by: ADVANCED PRACTICE MIDWIFE

## 2022-08-23 PROCEDURE — 1159F PR MEDICATION LIST DOCUMENTED IN MEDICAL RECORD: ICD-10-PCS | Mod: CPTII,,, | Performed by: ADVANCED PRACTICE MIDWIFE

## 2022-08-23 PROCEDURE — 3078F DIAST BP <80 MM HG: CPT | Mod: CPTII,,, | Performed by: ADVANCED PRACTICE MIDWIFE

## 2022-08-23 PROCEDURE — 99999 PR PBB SHADOW E&M-EST. PATIENT-LVL III: ICD-10-PCS | Mod: PBBFAC,,, | Performed by: ADVANCED PRACTICE MIDWIFE

## 2022-08-23 PROCEDURE — 3074F PR MOST RECENT SYSTOLIC BLOOD PRESSURE < 130 MM HG: ICD-10-PCS | Mod: CPTII,,, | Performed by: ADVANCED PRACTICE MIDWIFE

## 2022-08-23 PROCEDURE — 99213 OFFICE O/P EST LOW 20 MIN: CPT | Mod: PBBFAC | Performed by: ADVANCED PRACTICE MIDWIFE

## 2022-08-23 PROCEDURE — 3078F PR MOST RECENT DIASTOLIC BLOOD PRESSURE < 80 MM HG: ICD-10-PCS | Mod: CPTII,,, | Performed by: ADVANCED PRACTICE MIDWIFE

## 2022-08-23 PROCEDURE — 3008F PR BODY MASS INDEX (BMI) DOCUMENTED: ICD-10-PCS | Mod: CPTII,,, | Performed by: ADVANCED PRACTICE MIDWIFE

## 2022-08-23 PROCEDURE — 99999 PR PBB SHADOW E&M-EST. PATIENT-LVL III: CPT | Mod: PBBFAC,,, | Performed by: ADVANCED PRACTICE MIDWIFE

## 2022-08-23 PROCEDURE — 87510 GARDNER VAG DNA DIR PROBE: CPT | Performed by: ADVANCED PRACTICE MIDWIFE

## 2022-08-23 PROCEDURE — 3008F BODY MASS INDEX DOCD: CPT | Mod: CPTII,,, | Performed by: ADVANCED PRACTICE MIDWIFE

## 2022-08-23 PROCEDURE — 1159F MED LIST DOCD IN RCRD: CPT | Mod: CPTII,,, | Performed by: ADVANCED PRACTICE MIDWIFE

## 2022-08-23 PROCEDURE — 3074F SYST BP LT 130 MM HG: CPT | Mod: CPTII,,, | Performed by: ADVANCED PRACTICE MIDWIFE

## 2022-08-23 PROCEDURE — 99213 OFFICE O/P EST LOW 20 MIN: CPT | Mod: S$PBB,,, | Performed by: ADVANCED PRACTICE MIDWIFE

## 2022-08-23 NOTE — PROGRESS NOTES
Rina Keene is a 22 y.o. female  [facility] with complaint of vaginal discharge over the course of the last week. Pt reports she had used a 7 day Monistat but continues with irritation.       ROS:  GENERAL: No fever, chills, fatigability or weight loss.  VULVAR: No pain, no lesions and no itching.  VAGINAL: No relaxation, no abnormal bleeding and no lesions. Reports irritation.  ABDOMEN: No abdominal pain. Denies nausea. Denies vomiting. No diarrhea. No constipation  BREAST: Denies pain. No lumps. No discharge.  URINARY: No incontinence, no nocturia, no frequency and no dysuria.        Review of patient's allergies indicates:  No Known Allergies    Current Outpatient Medications:     fluticasone propionate (FLONASE) 50 mcg/actuation nasal spray, 2 sprays by Each Nostril route once daily., Disp: , Rfl:     loratadine (CLARITIN) 10 mg tablet, Take 1 tablet (10 mg total) by mouth once daily., Disp: 30 tablet, Rfl: 2    norgestimate-ethinyl estradioL (ORTHO TRI-CYCLEN LO) 0.18/0.215/0.25 mg-25 mcg tablet, Take 1 tablet by mouth once daily., Disp: 84 tablet, Rfl: 3    Past Medical History:   Diagnosis Date    Asthma, currently inactive     seasonal    Scoliosis      Past Surgical History:   Procedure Laterality Date    VAGINAL DELIVERY  2017    Boy    WISDOM TOOTH EXTRACTION       Social History     Tobacco Use    Smoking status: Former Smoker     Types: Cigarettes    Smokeless tobacco: Never Used   Substance Use Topics    Alcohol use: Yes     Comment: social     Drug use: No     OB History    Para Term  AB Living   1 1 1 0 0 1   SAB IAB Ectopic Multiple Live Births   0 0 0 0 1      # Outcome Date GA Lbr Stuart/2nd Weight Sex Delivery Anes PTL Lv   1 Term 17 38w6d  2.92 kg (6 lb 7 oz) M Vag-Spont EPI N FELICIA      Obstetric Comments   Menarche ~10       /60   Ht 5' (1.524 m)   Wt 51.5 kg (113 lb 8.6 oz)   LMP 2022   BMI 22.17 kg/m²     PHYSICAL  EXAM:  GENERAL: Calm and appropriate affect, alert, oriented x4  SKIN: Color appropriate for race, warm and dry, clean and intact with no rashes.  RESP: Even, unlabored breathing  ABDOMEN: Soft, nontender, no masses.  :   Normal external female genitalia without lesions. Normal hair distribution. Adequate perineal body, normal urethral meatus.  Vagina pink and well rugated, no lesions, vaginal discharge -  Minimal, no odor  No significant cystocele or rectocele.  Cervix - no cervical motion tenderness.      ASSESSMENT / PLAN:    ICD-10-CM ICD-9-CM    1. Acute vaginitis  N76.0 616.10 Vaginosis Screen by DNA Probe     Acute vaginitis  -     Vaginosis Screen by DNA Probe          Patient was counseled today on vaginitis prevention including :  a. avoiding feminine products such as deoderant soaps, body wash, bubble bath, douches, scented toilet paper, deoderant tampons or pads, feminine wipes, chronic pad use, etc.  b. avoiding other vulvovaginal irritants such as long hot baths, humidity, tight, synthetic clothing, chlorine and sitting around in wet bathing suits  c. wearing cotton underwear, avoiding thong underwear and no underwear to bed  d. taking showers instead of baths and use a hair dryer on cool setting afterwards to dry  e. wearing cotton to exercise and shower immediately after exercise and change clothes  f. using polyurethane condoms without spermicide if sexually active and symptoms are triggered by intercourse  g. Discussed use of vagisil, along with repHresh and probiotics    FOLLOW UP:   Pending lab results, PRN lack of improvement.

## 2022-08-24 ENCOUNTER — PATIENT MESSAGE (OUTPATIENT)
Dept: OBSTETRICS AND GYNECOLOGY | Facility: CLINIC | Age: 22
End: 2022-08-24
Payer: MEDICAID

## 2022-08-24 DIAGNOSIS — B96.89 BV (BACTERIAL VAGINOSIS): Primary | ICD-10-CM

## 2022-08-24 DIAGNOSIS — N76.0 BV (BACTERIAL VAGINOSIS): Primary | ICD-10-CM

## 2022-08-24 RX ORDER — METRONIDAZOLE 500 MG/1
500 TABLET ORAL EVERY 12 HOURS
Qty: 14 TABLET | Refills: 0 | Status: SHIPPED | OUTPATIENT
Start: 2022-08-24 | End: 2022-08-31

## 2022-08-28 ENCOUNTER — HOSPITAL ENCOUNTER (EMERGENCY)
Facility: HOSPITAL | Age: 22
Discharge: HOME | End: 2022-08-29
Attending: EMERGENCY MEDICINE
Payer: MEDICAID

## 2022-08-28 ENCOUNTER — NURSE TRIAGE (OUTPATIENT)
Dept: ADMINISTRATIVE | Facility: CLINIC | Age: 22
End: 2022-08-28
Payer: MEDICAID

## 2022-08-28 DIAGNOSIS — B34.9 VIRAL SYNDROME: Primary | ICD-10-CM

## 2022-08-28 LAB
INFLUENZA A, MOLECULAR: NEGATIVE
INFLUENZA B, MOLECULAR: NEGATIVE
SARS-COV-2 RDRP RESP QL NAA+PROBE: NEGATIVE
SPECIMEN SOURCE: NORMAL

## 2022-08-28 PROCEDURE — 99283 EMERGENCY DEPT VISIT LOW MDM: CPT

## 2022-08-28 PROCEDURE — 86803 HEPATITIS C AB TEST: CPT | Performed by: EMERGENCY MEDICINE

## 2022-08-28 PROCEDURE — 87502 INFLUENZA DNA AMP PROBE: CPT | Performed by: EMERGENCY MEDICINE

## 2022-08-28 PROCEDURE — 36415 COLL VENOUS BLD VENIPUNCTURE: CPT | Performed by: EMERGENCY MEDICINE

## 2022-08-28 PROCEDURE — U0002 COVID-19 LAB TEST NON-CDC: HCPCS | Performed by: EMERGENCY MEDICINE

## 2022-08-28 PROCEDURE — 87389 HIV-1 AG W/HIV-1&-2 AB AG IA: CPT | Performed by: EMERGENCY MEDICINE

## 2022-08-29 VITALS
SYSTOLIC BLOOD PRESSURE: 115 MMHG | DIASTOLIC BLOOD PRESSURE: 65 MMHG | BODY MASS INDEX: 21.99 KG/M2 | HEART RATE: 98 BPM | HEIGHT: 60 IN | RESPIRATION RATE: 18 BRPM | TEMPERATURE: 98 F | OXYGEN SATURATION: 99 % | WEIGHT: 112 LBS

## 2022-08-29 LAB
HCV AB SERPL QL IA: NEGATIVE
HIV 1+2 AB+HIV1 P24 AG SERPL QL IA: NEGATIVE

## 2022-08-29 NOTE — TELEPHONE ENCOUNTER
"Patient c/o diarrhea x 6 starting this morning. Patient will go to ED. No pcp.    Reason for Disposition   [1] Drinking very little AND [2] dehydration suspected (e.g., no urine > 12 hours, very dry mouth, very lightheaded)    Additional Information   Negative: Shock suspected (e.g., cold/pale/clammy skin, too weak to stand, low BP, rapid pulse)   Negative: Difficult to awaken or acting confused  (e.g., disoriented, slurred speech)   Negative: Sounds like a life-threatening emergency to the triager   Negative: [1] SEVERE abdominal pain (e.g., excruciating) AND [2] present > 1 hour   Negative: [1] SEVERE abdominal pain AND [2] age > 60   Negative: [1] Blood in the stool AND [2] moderate or large amount of blood   Negative: Black or tarry bowel movements  (Exception: chronic-unchanged  black-grey bowel movements AND is taking iron pills or Pepto-bismol)    Answer Assessment - Initial Assessment Questions  1. DIARRHEA SEVERITY: "How bad is the diarrhea?" "How many extra stools have you had in the past 24 hours than normal?"     - MILD: Few loose or mushy BMs; increase of 1-3 stools over normal daily number of stools; mild increase in ostomy output.    - MODERATE: Increase of 4-6 stools daily over normal; moderate increase in ostomy output.    - SEVERE (or Worst Possible): Increase of 7 or more stools daily over normal; moderate increase in ostomy output; incontinence.      6  2. ONSET: "When did the diarrhea begin?"       This morning  3. BM CONSISTENCY: "How loose or watery is the diarrhea?"       watery  4. VOMITING: "Are you also vomiting?" If so, ask: "How many times in the past 24 hours?"       no  5. ABDOMINAL PAIN: "Are you having any abdominal pain?" If yes: "What does it feel like?" (e.g., crampy, dull, intermittent, constant)       No  6. ABDOMINAL PAIN SEVERITY: If present, ask: "How bad is the pain?"  (e.g., Scale 1-10; mild, moderate, or severe)     - MILD (1-3): doesn't interfere with normal activities, " "abdomen soft and not tender to touch      - MODERATE (4-7): interferes with normal activities or awakens from sleep, tender to touch      - SEVERE (8-10): excruciating pain, doubled over, unable to do any normal activities        Rib cage sore  7. ORAL INTAKE: If vomiting, "Have you been able to drink liquids?" "How much fluids have you had in the past 24 hours?"      Yes. Water   8. HYDRATION: "Any signs of dehydration?" (e.g., dry mouth [not just dry lips], too weak to stand, dizziness, new weight loss) "When did you last urinate?"      Dizzy, lightheaded  9. EXPOSURE: "Have you traveled to a foreign country recently?" "Have you been exposed to anyone with diarrhea?" "Could you have eaten any food that was spoiled?"      No  10. OTHER SYMPTOMS: "Do you have any other symptoms?" (e.g., fever, blood in stool)        No  11. PREGNANCY: "Is there any chance you are pregnant?" "When was your last menstrual period?"        Could be, Lmp 2-3 weeks ago    Protocols used: Diarrhea-A-AH    "

## 2022-08-29 NOTE — ED PROVIDER NOTES
Encounter Date: 2022       History     Chief Complaint   Patient presents with    Diarrhea     'all day' and nausea and feeling weak     22-year-old female with no past medical history presents with a chief complaint of diarrhea.  The patient reports that started today and has been persistent.  She reports that it is associated with fatigue as well as chills.  She denies any associated fever, nausea vomiting, abdominal pain, chest pain, shortness of breath, back pain, dysuria, sore throat, or cough.  There are no alleviating or aggravating factors.  The patient's son is also in the emergency department being seen for a likely viral illness.    Review of patient's allergies indicates:  No Known Allergies  Past Medical History:   Diagnosis Date    Asthma, currently inactive     seasonal    Scoliosis      Past Surgical History:   Procedure Laterality Date    VAGINAL DELIVERY  2017    Boy    WISDOM TOOTH EXTRACTION       Family History   Problem Relation Age of Onset    Miscarriages / Stillbirths Mother     Miscarriages / Stillbirths Maternal Grandmother     Diabetes Maternal Grandfather     Stroke Maternal Grandfather     Hypertension Maternal Grandfather     Miscarriages / Stillbirths Maternal Aunt     Breast cancer Neg Hx     Colon cancer Neg Hx     Ovarian cancer Neg Hx      labor Neg Hx     Cancer Neg Hx      Social History     Tobacco Use    Smoking status: Former     Types: Cigarettes    Smokeless tobacco: Never   Substance Use Topics    Alcohol use: Yes     Comment: social     Drug use: No     Review of Systems   Constitutional:  Positive for chills and fatigue. Negative for fever.   HENT:  Negative for congestion.    Respiratory:  Negative for cough and shortness of breath.    Cardiovascular:  Negative for chest pain.   Gastrointestinal:  Positive for diarrhea. Negative for abdominal pain, nausea and vomiting.   Genitourinary:  Negative for dysuria.   Musculoskeletal:  Negative for gait problem.    Skin:  Negative for color change.   Neurological:  Negative for dizziness and numbness.   Psychiatric/Behavioral:  Negative for agitation.      Physical Exam     Initial Vitals [08/28/22 2115]   BP Pulse Resp Temp SpO2   110/61 98 18 98.1 °F (36.7 °C) 98 %      MAP       --         Physical Exam    Nursing note and vitals reviewed.  Constitutional: She appears well-developed and well-nourished.   HENT:   Head: Normocephalic and atraumatic.   Right Ear: External ear normal.   Nose: Nose normal.   Eyes: EOM are normal. Pupils are equal, round, and reactive to light.   Neck:   Normal range of motion.  Cardiovascular:  Normal rate, regular rhythm, normal heart sounds and intact distal pulses.           Pulmonary/Chest: Breath sounds normal. She exhibits no tenderness.   Abdominal: Abdomen is soft. Bowel sounds are normal. She exhibits no distension. There is no abdominal tenderness. There is no rebound and no guarding.   Musculoskeletal:         General: Normal range of motion.      Right shoulder: Normal.      Left shoulder: Normal.      Cervical back: Normal range of motion.     Neurological: She is alert and oriented to person, place, and time.   Skin: Skin is warm and dry.   Psychiatric: She has a normal mood and affect.       ED Course   Procedures  Labs Reviewed   INFLUENZA A & B BY MOLECULAR   SARS-COV-2 RNA AMPLIFICATION, QUAL   HIV 1 / 2 ANTIBODY   HEPATITIS C ANTIBODY          Imaging Results    None          Medications - No data to display  Medical Decision Making:   Initial Assessment:   22-year-old female presented with multiple complaints.  Differential Diagnosis:   Initial differential diagnosis included but not limited to COVID, influenza, and viral illness.  Clinical Tests:   Lab Tests: Ordered and Reviewed  ED Management:  The patient was emergently evaluated in the emergency department, her evaluation was significant for a young female with a benign abdominal exam.  The patient's COVID and  influenza swabs were noted to be negative.  The etiology of her symptoms is likely a viral illness.  The patient can take over-the-counter medication as needed for symptomatic relief.  She is referred to primary care for follow-up.                    Clinical Impression:   Final diagnoses:  [B34.9] Viral syndrome (Primary)      ED Disposition Condition    Discharge Stable          ED Prescriptions    None       Follow-up Information       Follow up With Specialties Details Why Contact [facility]  Schedule an appointment as soon as possible for a visit   [address] [place] [id number]  [phone]               Arthur Zhao MD  08/28/22 0932

## 2022-09-20 ENCOUNTER — OFFICE VISIT (OUTPATIENT)
Dept: OBSTETRICS AND GYNECOLOGY | Facility: CLINIC | Age: 22
End: 2022-09-20
Payer: MEDICAID

## 2022-09-20 VITALS
HEIGHT: 60 IN | DIASTOLIC BLOOD PRESSURE: 60 MMHG | SYSTOLIC BLOOD PRESSURE: 110 MMHG | WEIGHT: 110.81 LBS | BODY MASS INDEX: 21.75 KG/M2

## 2022-09-20 DIAGNOSIS — N92.6 MISSED MENSES: ICD-10-CM

## 2022-09-20 DIAGNOSIS — Z34.90 EARLY STAGE OF PREGNANCY: Primary | ICD-10-CM

## 2022-09-20 PROCEDURE — 3078F PR MOST RECENT DIASTOLIC BLOOD PRESSURE < 80 MM HG: ICD-10-PCS | Mod: CPTII,,, | Performed by: NURSE PRACTITIONER

## 2022-09-20 PROCEDURE — 3074F PR MOST RECENT SYSTOLIC BLOOD PRESSURE < 130 MM HG: ICD-10-PCS | Mod: CPTII,,, | Performed by: NURSE PRACTITIONER

## 2022-09-20 PROCEDURE — 1160F PR REVIEW ALL MEDS BY PRESCRIBER/CLIN PHARMACIST DOCUMENTED: ICD-10-PCS | Mod: CPTII,,, | Performed by: NURSE PRACTITIONER

## 2022-09-20 PROCEDURE — 87491 CHLMYD TRACH DNA AMP PROBE: CPT | Performed by: NURSE PRACTITIONER

## 2022-09-20 PROCEDURE — 3008F BODY MASS INDEX DOCD: CPT | Mod: CPTII,,, | Performed by: NURSE PRACTITIONER

## 2022-09-20 PROCEDURE — 99204 OFFICE O/P NEW MOD 45 MIN: CPT | Mod: S$PBB,TH,, | Performed by: NURSE PRACTITIONER

## 2022-09-20 PROCEDURE — 1159F MED LIST DOCD IN RCRD: CPT | Mod: CPTII,,, | Performed by: NURSE PRACTITIONER

## 2022-09-20 PROCEDURE — 1160F RVW MEDS BY RX/DR IN RCRD: CPT | Mod: CPTII,,, | Performed by: NURSE PRACTITIONER

## 2022-09-20 PROCEDURE — 99213 OFFICE O/P EST LOW 20 MIN: CPT | Mod: PBBFAC,TH | Performed by: NURSE PRACTITIONER

## 2022-09-20 PROCEDURE — 3078F DIAST BP <80 MM HG: CPT | Mod: CPTII,,, | Performed by: NURSE PRACTITIONER

## 2022-09-20 PROCEDURE — 1159F PR MEDICATION LIST DOCUMENTED IN MEDICAL RECORD: ICD-10-PCS | Mod: CPTII,,, | Performed by: NURSE PRACTITIONER

## 2022-09-20 PROCEDURE — 99204 PR OFFICE/OUTPT VISIT, NEW, LEVL IV, 45-59 MIN: ICD-10-PCS | Mod: S$PBB,TH,, | Performed by: NURSE PRACTITIONER

## 2022-09-20 PROCEDURE — 3008F PR BODY MASS INDEX (BMI) DOCUMENTED: ICD-10-PCS | Mod: CPTII,,, | Performed by: NURSE PRACTITIONER

## 2022-09-20 PROCEDURE — 3074F SYST BP LT 130 MM HG: CPT | Mod: CPTII,,, | Performed by: NURSE PRACTITIONER

## 2022-09-20 PROCEDURE — 99999 PR PBB SHADOW E&M-EST. PATIENT-LVL III: CPT | Mod: PBBFAC,,, | Performed by: NURSE PRACTITIONER

## 2022-09-20 PROCEDURE — 87591 N.GONORRHOEAE DNA AMP PROB: CPT | Performed by: NURSE PRACTITIONER

## 2022-09-20 PROCEDURE — 99999 PR PBB SHADOW E&M-EST. PATIENT-LVL III: ICD-10-PCS | Mod: PBBFAC,,, | Performed by: NURSE PRACTITIONER

## 2022-09-20 PROCEDURE — 87086 URINE CULTURE/COLONY COUNT: CPT | Performed by: NURSE PRACTITIONER

## 2022-09-20 NOTE — PROGRESS NOTES
CC: Positive Pregnancy Test    HISTORY OF PRESENT ILLNESS:    Rina Keene is a 22 y.o. female, ,  Presents today for a routine exam complaining of amenorrhea and positive home urine pregnancy test.  Patient's last menstrual period was 2022 (exact date).   She is not currently on any contraception. Taking PNV. Reports breast tenderness. Denies vaginal bleeding and pelvic pain.    . No reported medical history for patient or FOB. No reported personal/familial history of genetic or chromosomal issues for patient or FOB. No reported abdominal surgeries.      ROS:  GENERAL: No weight changes. No swelling. No fatigue. No fever.  CARDIOVASCULAR: No chest pain. No shortness of breath. No leg cramps.   NEUROLOGICAL: No headaches. No vision changes.  BREASTS: No pain. No lumps. No discharge.  ABDOMEN: No pain. No diarrhea. No constipation.  REPRODUCTIVE: No abnormal bleeding.   VULVA: No pain. No lesions. No itching.  VAGINA: No relaxation. No itching. No odor. No discharge. No lesions.  URINARY: No incontinence. No nocturia. No frequency. No dysuria.    MEDICATIONS AND ALLERGIES:  Reviewed        COMPREHENSIVE GYN HISTORY:  PAP History: Denies abnormal Paps.  Infection History: Denies STDs. Denies PID.  Benign History: Denies uterine fibroids. Denies ovarian cysts. Denies endometriosis. Denies other conditions.  Cancer History: Denies cervical cancer. Denies uterine cancer or hyperplasia. Denies ovarian cancer. Denies vulvar cancer or pre-cancer. Denies vaginal cancer or pre-cancer. Denies breast cancer. Denies colon cancer.  Sexual Activity History: Reports currently being sexually active  Menstrual History: None.  Contraception: None    /60   Ht 5' (1.524 m)   Wt 50.3 kg (110 lb 12.5 oz)   LMP 2022 (Exact Date)   BMI 21.64 kg/m²     PE:  AFFECT: Calm, alert and oriented X 3. Interactive during exam  GENERAL: Appears well-nourished, well-developed, in no acute distress.  HEAD:  Normocephalic, atruamatic  SKIN: Normal for race, warm, & dry. No lesions or rashes.  ABDOMEN: Soft and nontender without masses or organomegally.  EXTREMITIES: No cyanosis, clubbing or edema. No calf tenderness.  LYMPH NODES: No axillary or inguinal adenopathy.    PROCEDURES:  UPT Positive  Genprobe      ASSESSMENT/PLAN:  Amenorrhea  Positive urine pregnancy test (EWA: 2023, EGA: 6w5d based on LMP)    -  Routine prenatal care    Nausea and vomiting in pregnancy    -  Education regarding lifestyle and dietary modifications    -  Advised use of B6/Unisom. Pt will notify us if no relief/worsening symptoms, will consider Zofran if needed.      1st TRIMESTER COUNSELING:   Common complaints of pregnancy  HIV and other routine prenatal tests including  genetic screening  Risk factors identified by prenatal history  Oriented to practice - discussed anticipated course of prenatal care & indications for Ultrasound  Childbirth classes/Hospital facilities   Nutrition and weight gain counseling  Toxoplasmosis precautions (Cats/Raw Meat)  Sexual activity and exercise  Environmental/Work hazards  Travel  Tobacco (Ask, Advise, Assess, Assist, and Arrange), as well as alcohol and drug use  Use of any medications (Including supplements, Vitamins, Herbs, or OTC Drugs)  Domestic violence  Seat belt use      TERATOLOGY COUNSELING:   Discussed indications and options for aneuploidy screening - pamphlets given    -  Pt declines testing    Dating US in 2-3 weeks  FOLLOW-UP in 2-3 weeks with GEOVANY Gonzalez NP    OB/GYN

## 2022-09-21 LAB
BACTERIA UR CULT: NORMAL
C TRACH DNA SPEC QL NAA+PROBE: NOT DETECTED
N GONORRHOEA DNA SPEC QL NAA+PROBE: NOT DETECTED

## 2022-10-07 ENCOUNTER — PROCEDURE VISIT (OUTPATIENT)
Dept: OBSTETRICS AND GYNECOLOGY | Facility: CLINIC | Age: 22
End: 2022-10-07
Payer: MEDICAID

## 2022-10-07 ENCOUNTER — OFFICE VISIT (OUTPATIENT)
Dept: OBSTETRICS AND GYNECOLOGY | Facility: CLINIC | Age: 22
End: 2022-10-07
Payer: MEDICAID

## 2022-10-07 ENCOUNTER — LAB VISIT (OUTPATIENT)
Dept: LAB | Facility: HOSPITAL | Age: 22
End: 2022-10-07
Attending: NURSE PRACTITIONER
Payer: MEDICAID

## 2022-10-07 VITALS
DIASTOLIC BLOOD PRESSURE: 64 MMHG | SYSTOLIC BLOOD PRESSURE: 112 MMHG | BODY MASS INDEX: 22.38 KG/M2 | WEIGHT: 114 LBS | HEIGHT: 60 IN

## 2022-10-07 DIAGNOSIS — Z34.90 EARLY STAGE OF PREGNANCY: Primary | ICD-10-CM

## 2022-10-07 DIAGNOSIS — O21.9 NAUSEA AND VOMITING IN PREGNANCY: ICD-10-CM

## 2022-10-07 DIAGNOSIS — Z34.90 EARLY STAGE OF PREGNANCY: ICD-10-CM

## 2022-10-07 LAB
ANION GAP SERPL CALC-SCNC: 10 MMOL/L (ref 8–16)
BASOPHILS # BLD AUTO: 0.02 K/UL (ref 0–0.2)
BASOPHILS NFR BLD: 0.3 % (ref 0–1.9)
BUN SERPL-MCNC: 11 MG/DL (ref 6–20)
CALCIUM SERPL-MCNC: 9 MG/DL (ref 8.7–10.5)
CHLORIDE SERPL-SCNC: 105 MMOL/L (ref 95–110)
CO2 SERPL-SCNC: 21 MMOL/L (ref 23–29)
CREAT SERPL-MCNC: 0.7 MG/DL (ref 0.5–1.4)
DIFFERENTIAL METHOD: ABNORMAL
EOSINOPHIL # BLD AUTO: 0 K/UL (ref 0–0.5)
EOSINOPHIL NFR BLD: 0.6 % (ref 0–8)
ERYTHROCYTE [DISTWIDTH] IN BLOOD BY AUTOMATED COUNT: 12.5 % (ref 11.5–14.5)
EST. GFR  (NO RACE VARIABLE): >60 ML/MIN/1.73 M^2
GLUCOSE SERPL-MCNC: 83 MG/DL (ref 70–110)
HBV SURFACE AG SERPL QL IA: NORMAL
HCT VFR BLD AUTO: 31.9 % (ref 37–48.5)
HGB BLD-MCNC: 10.7 G/DL (ref 12–16)
HIV 1+2 AB+HIV1 P24 AG SERPL QL IA: NORMAL
IMM GRANULOCYTES # BLD AUTO: 0.02 K/UL (ref 0–0.04)
IMM GRANULOCYTES NFR BLD AUTO: 0.3 % (ref 0–0.5)
LYMPHOCYTES # BLD AUTO: 2.3 K/UL (ref 1–4.8)
LYMPHOCYTES NFR BLD: 33.6 % (ref 18–48)
MCH RBC QN AUTO: 30.7 PG (ref 27–31)
MCHC RBC AUTO-ENTMCNC: 33.5 G/DL (ref 32–36)
MCV RBC AUTO: 92 FL (ref 82–98)
MONOCYTES # BLD AUTO: 0.5 K/UL (ref 0.3–1)
MONOCYTES NFR BLD: 7.3 % (ref 4–15)
NEUTROPHILS # BLD AUTO: 4 K/UL (ref 1.8–7.7)
NEUTROPHILS NFR BLD: 57.9 % (ref 38–73)
NRBC BLD-RTO: 0 /100 WBC
PLATELET # BLD AUTO: 287 K/UL (ref 150–450)
PMV BLD AUTO: 10.1 FL (ref 9.2–12.9)
POTASSIUM SERPL-SCNC: 3.8 MMOL/L (ref 3.5–5.1)
RBC # BLD AUTO: 3.48 M/UL (ref 4–5.4)
SODIUM SERPL-SCNC: 136 MMOL/L (ref 136–145)
TSH SERPL DL<=0.005 MIU/L-ACNC: 1.88 UIU/ML (ref 0.4–4)
WBC # BLD AUTO: 6.96 K/UL (ref 3.9–12.7)

## 2022-10-07 PROCEDURE — 3078F PR MOST RECENT DIASTOLIC BLOOD PRESSURE < 80 MM HG: ICD-10-PCS | Mod: CPTII,,, | Performed by: NURSE PRACTITIONER

## 2022-10-07 PROCEDURE — 1160F RVW MEDS BY RX/DR IN RCRD: CPT | Mod: CPTII,,, | Performed by: NURSE PRACTITIONER

## 2022-10-07 PROCEDURE — 99203 PR OFFICE/OUTPT VISIT, NEW, LEVL III, 30-44 MIN: ICD-10-PCS | Mod: S$PBB,TH,, | Performed by: NURSE PRACTITIONER

## 2022-10-07 PROCEDURE — 1159F PR MEDICATION LIST DOCUMENTED IN MEDICAL RECORD: ICD-10-PCS | Mod: CPTII,,, | Performed by: NURSE PRACTITIONER

## 2022-10-07 PROCEDURE — 86762 RUBELLA ANTIBODY: CPT | Performed by: NURSE PRACTITIONER

## 2022-10-07 PROCEDURE — 99999 PR PBB SHADOW E&M-EST. PATIENT-LVL III: CPT | Mod: PBBFAC,,, | Performed by: NURSE PRACTITIONER

## 2022-10-07 PROCEDURE — 1160F PR REVIEW ALL MEDS BY PRESCRIBER/CLIN PHARMACIST DOCUMENTED: ICD-10-PCS | Mod: CPTII,,, | Performed by: NURSE PRACTITIONER

## 2022-10-07 PROCEDURE — 85025 COMPLETE CBC W/AUTO DIFF WBC: CPT | Performed by: NURSE PRACTITIONER

## 2022-10-07 PROCEDURE — 86901 BLOOD TYPING SEROLOGIC RH(D): CPT | Performed by: NURSE PRACTITIONER

## 2022-10-07 PROCEDURE — 87340 HEPATITIS B SURFACE AG IA: CPT | Performed by: NURSE PRACTITIONER

## 2022-10-07 PROCEDURE — 86803 HEPATITIS C AB TEST: CPT | Performed by: NURSE PRACTITIONER

## 2022-10-07 PROCEDURE — 76801 OB US < 14 WKS SINGLE FETUS: CPT | Mod: PBBFAC,PN

## 2022-10-07 PROCEDURE — 87389 HIV-1 AG W/HIV-1&-2 AB AG IA: CPT | Performed by: NURSE PRACTITIONER

## 2022-10-07 PROCEDURE — 3078F DIAST BP <80 MM HG: CPT | Mod: CPTII,,, | Performed by: NURSE PRACTITIONER

## 2022-10-07 PROCEDURE — 76817 TRANSVAGINAL US OBSTETRIC: CPT | Mod: 26,S$PBB,, | Performed by: OBSTETRICS & GYNECOLOGY

## 2022-10-07 PROCEDURE — 76817 US OB/GYN EXTENDED PROCEDURE (VIEWPOINT): ICD-10-PCS | Mod: 26,S$PBB,, | Performed by: OBSTETRICS & GYNECOLOGY

## 2022-10-07 PROCEDURE — 86592 SYPHILIS TEST NON-TREP QUAL: CPT | Performed by: NURSE PRACTITIONER

## 2022-10-07 PROCEDURE — 84443 ASSAY THYROID STIM HORMONE: CPT | Performed by: NURSE PRACTITIONER

## 2022-10-07 PROCEDURE — 99213 OFFICE O/P EST LOW 20 MIN: CPT | Mod: PBBFAC,25,TH,PN | Performed by: NURSE PRACTITIONER

## 2022-10-07 PROCEDURE — 80048 BASIC METABOLIC PNL TOTAL CA: CPT | Performed by: NURSE PRACTITIONER

## 2022-10-07 PROCEDURE — 3008F BODY MASS INDEX DOCD: CPT | Mod: CPTII,,, | Performed by: NURSE PRACTITIONER

## 2022-10-07 PROCEDURE — 1159F MED LIST DOCD IN RCRD: CPT | Mod: CPTII,,, | Performed by: NURSE PRACTITIONER

## 2022-10-07 PROCEDURE — 76801 US OB/GYN EXTENDED PROCEDURE (VIEWPOINT): ICD-10-PCS | Mod: 26,S$PBB,, | Performed by: OBSTETRICS & GYNECOLOGY

## 2022-10-07 PROCEDURE — 99203 OFFICE O/P NEW LOW 30 MIN: CPT | Mod: S$PBB,TH,, | Performed by: NURSE PRACTITIONER

## 2022-10-07 PROCEDURE — 3008F PR BODY MASS INDEX (BMI) DOCUMENTED: ICD-10-PCS | Mod: CPTII,,, | Performed by: NURSE PRACTITIONER

## 2022-10-07 PROCEDURE — 3074F PR MOST RECENT SYSTOLIC BLOOD PRESSURE < 130 MM HG: ICD-10-PCS | Mod: CPTII,,, | Performed by: NURSE PRACTITIONER

## 2022-10-07 PROCEDURE — 76801 OB US < 14 WKS SINGLE FETUS: CPT | Mod: 26,S$PBB,, | Performed by: OBSTETRICS & GYNECOLOGY

## 2022-10-07 PROCEDURE — 99999 PR PBB SHADOW E&M-EST. PATIENT-LVL III: ICD-10-PCS | Mod: PBBFAC,,, | Performed by: NURSE PRACTITIONER

## 2022-10-07 PROCEDURE — 3074F SYST BP LT 130 MM HG: CPT | Mod: CPTII,,, | Performed by: NURSE PRACTITIONER

## 2022-10-07 RX ORDER — DOXYLAMINE SUCCINATE AND PYRIDOXINE HYDROCHLORIDE, DELAYED RELEASE TABLETS 10 MG/10 MG 10; 10 MG/1; MG/1
2 TABLET, DELAYED RELEASE ORAL NIGHTLY
Qty: 60 TABLET | Refills: 1 | Status: SHIPPED | OUTPATIENT
Start: 2022-10-07 | End: 2022-11-04

## 2022-10-07 RX ORDER — ONDANSETRON 4 MG/1
4 TABLET, ORALLY DISINTEGRATING ORAL EVERY 12 HOURS PRN
Qty: 60 TABLET | Refills: 1 | Status: SHIPPED | OUTPATIENT
Start: 2022-10-07 | End: 2022-11-06

## 2022-10-07 NOTE — PROGRESS NOTES
History & Physical  Gynecology      SUBJECTIVE:     Chief Complaint: Absent Menses (Pregnancy confirmation)       History of Present Illness: Presents for initial OB visit. Dating US revealed SLIUP. Nausea reported, desires script. Declines NIPT.         Review of patient's allergies indicates:  No Known Allergies    Past Medical History:   Diagnosis Date    Asthma, currently inactive     seasonal    Scoliosis      Past Surgical History:   Procedure Laterality Date    VAGINAL DELIVERY  2017    Boy    WISDOM TOOTH EXTRACTION       OB History          2    Para   1    Term   1       0    AB   0    Living   1         SAB   0    IAB   0    Ectopic   0    Multiple   0    Live Births   1           Obstetric Comments   Menarche ~10             Family History   Problem Relation Age of Onset    Miscarriages / Stillbirths Mother     Miscarriages / Stillbirths Maternal Grandmother     Diabetes Maternal Grandfather     Stroke Maternal Grandfather     Hypertension Maternal Grandfather     Miscarriages / Stillbirths Maternal Aunt     Breast cancer Neg Hx     Colon cancer Neg Hx     Ovarian cancer Neg Hx      labor Neg Hx     Cancer Neg Hx      Social History     Tobacco Use    Smoking status: Former     Types: Cigarettes    Smokeless tobacco: Never   Substance Use Topics    Alcohol use: Yes     Comment: social     Drug use: No       Current Outpatient Medications   Medication Sig    prenatal 25/iron fum/folic/dha (PRENATAL-1 ORAL) Take by mouth.    doxylamine-pyridoxine, vit B6, (DICLEGIS) 10-10 mg TbEC Take 2 tablets by mouth every evening.    fluticasone propionate (FLONASE) 50 mcg/actuation nasal spray 2 sprays by Each Nostril route once daily.    norgestimate-ethinyl estradioL (ORTHO TRI-CYCLEN LO) 0.18/0.215/0.25 mg-25 mcg tablet Take 1 tablet by mouth once daily.    ondansetron (ZOFRAN-ODT) 4 MG TbDL Take 1 tablet (4 mg total) by mouth every 12 (twelve) hours as needed (nausea).     No current  facility-administered medications for this visit.         Review of Systems:  Review of Systems   Constitutional:  Negative for activity change, appetite change, chills, fatigue and fever.   HENT:  Negative for mouth sores.    Eyes:  Negative for visual disturbance.   Respiratory:  Negative for cough and shortness of breath.    Cardiovascular:  Negative for chest pain and leg swelling.   Gastrointestinal:  Positive for nausea. Negative for abdominal pain, constipation, diarrhea, vomiting and reflux.   Endocrine: Negative for hyperthyroidism and hypothyroidism.   Genitourinary:  Negative for dysuria, flank pain, frequency, genital sores, hematuria, menstrual problem, pelvic pain, vaginal bleeding, vaginal discharge, vaginal pain, vaginal dryness and vaginal odor.   Musculoskeletal:  Negative for arthralgias, back pain and myalgias.   Integumentary:  Negative for rash, breast mass and breast tenderness.   Neurological:  Negative for headaches.   Hematological:  Negative for adenopathy. Does not bruise/bleed easily.   Psychiatric/Behavioral:  Negative for depression. The patient is not nervous/anxious.    Breast: Negative for asymmetry, mass and tenderness     OBJECTIVE:     Physical Exam:  Physical Exam  Vitals and nursing note reviewed.   Constitutional:       Appearance: Normal appearance.   HENT:      Head: Normocephalic and atraumatic.      Nose: Nose normal.      Mouth/Throat:      Mouth: Mucous membranes are moist.   Eyes:      Conjunctiva/sclera: Conjunctivae normal.   Cardiovascular:      Rate and Rhythm: Normal rate.   Pulmonary:      Effort: Pulmonary effort is normal.      Breath sounds: Normal breath sounds.   Abdominal:      Palpations: Abdomen is soft.   Genitourinary:     Comments: Deferred  Musculoskeletal:         General: Normal range of motion.      Cervical back: Normal range of motion.   Skin:     General: Skin is warm and dry.   Neurological:      General: No focal deficit present.      Mental  Status: She is alert.   Psychiatric:         Mood and Affect: Mood normal.         Behavior: Behavior normal.         Thought Content: Thought content normal.         Judgment: Judgment normal.         ASSESSMENT:       ICD-10-CM ICD-9-CM    1. Nausea and vomiting in pregnancy  O21.9 643.90 ondansetron (ZOFRAN-ODT) 4 MG TbDL      doxylamine-pyridoxine, vit B6, (DICLEGIS) 10-10 mg TbEC      2. Early stage of pregnancy  Z34.90 V22.2 Hepatitis C Antibody      HIV 1/2 Ag/Ab (4th Gen)      RPR      Hepatitis B surface antigen      Type & Screen      Rubella antibody, IgG      CBC auto differential      Basic metabolic panel      TSH      US MFM Procedure ([facility])-Future             Plan:      Dating US reviewed. Initial labs today. Anatomy scan ordered. Scripts for Diclegis and Zofran.       FU in 4 weeks for AURE.    Counseling time: 20 minutes       Naida Gonzalez, JESSIP-C

## 2022-10-08 LAB
ABO + RH BLD: NORMAL
BLD GP AB SCN CELLS X3 SERPL QL: NORMAL
HCV AB SERPL QL IA: NORMAL
RPR SER QL: NORMAL

## 2022-10-10 LAB
RUBV IGG SER-ACNC: 26 IU/ML
RUBV IGG SER-IMP: REACTIVE

## 2022-11-04 ENCOUNTER — ROUTINE PRENATAL (OUTPATIENT)
Dept: OBSTETRICS AND GYNECOLOGY | Facility: CLINIC | Age: 22
End: 2022-11-04
Payer: MEDICAID

## 2022-11-04 VITALS
WEIGHT: 116.19 LBS | DIASTOLIC BLOOD PRESSURE: 60 MMHG | SYSTOLIC BLOOD PRESSURE: 100 MMHG | BODY MASS INDEX: 22.69 KG/M2

## 2022-11-04 DIAGNOSIS — R11.0 PREGNANCY RELATED NAUSEA, ANTEPARTUM: ICD-10-CM

## 2022-11-04 DIAGNOSIS — Z3A.11 11 WEEKS GESTATION OF PREGNANCY: Primary | ICD-10-CM

## 2022-11-04 DIAGNOSIS — O26.899 PREGNANCY RELATED NAUSEA, ANTEPARTUM: ICD-10-CM

## 2022-11-04 PROCEDURE — 99212 OFFICE O/P EST SF 10 MIN: CPT | Mod: PBBFAC,TH,PN | Performed by: NURSE PRACTITIONER

## 2022-11-04 PROCEDURE — 99999 PR PBB SHADOW E&M-EST. PATIENT-LVL II: ICD-10-PCS | Mod: PBBFAC,,, | Performed by: NURSE PRACTITIONER

## 2022-11-04 PROCEDURE — 99212 OFFICE O/P EST SF 10 MIN: CPT | Mod: TH,S$PBB,, | Performed by: NURSE PRACTITIONER

## 2022-11-04 PROCEDURE — 99212 PR OFFICE/OUTPT VISIT, EST, LEVL II, 10-19 MIN: ICD-10-PCS | Mod: TH,S$PBB,, | Performed by: NURSE PRACTITIONER

## 2022-11-04 PROCEDURE — 99999 PR PBB SHADOW E&M-EST. PATIENT-LVL II: CPT | Mod: PBBFAC,,, | Performed by: NURSE PRACTITIONER

## 2022-11-04 RX ORDER — DOXYLAMINE SUCCINATE AND PYRIDOXINE HYDROCHLORIDE, DELAYED RELEASE TABLETS 10 MG/10 MG 10; 10 MG/1; MG/1
2 TABLET, DELAYED RELEASE ORAL NIGHTLY
Qty: 60 TABLET | Refills: 1 | Status: SHIPPED | OUTPATIENT
Start: 2022-11-04 | End: 2022-12-08

## 2022-11-04 NOTE — PROGRESS NOTES
Presents at 11w5d for AURE. Nausea, some difficulty tolerating Iron, recommended taking at bedtime. Vscan with FM,FHM. Connected MOM initiated. Anatomy scan is scheduled. FU in 4 weeks for AURE.

## 2022-11-04 NOTE — LETTER
November 4, 2022    Rina Keene  [address] [facility] [address]         [facility]  [address]  [facility] [address]  Phone: [phone]  Fax: [phone] November 4, 2022     Patient: Rina Keene   YOB: 2000   Date of Visit: 11/4/2022           To Whom It May Concern:     Rina Keene was seen in my office today, please excuse her.  If you have any questions or concerns, please don't hesitate to call.    Sincerely,    Naida Gonzalez, NP

## 2022-11-07 ENCOUNTER — TELEPHONE (OUTPATIENT)
Dept: PHARMACY | Facility: CLINIC | Age: 22
End: 2022-11-07
Payer: MEDICAID

## 2022-11-13 ENCOUNTER — OFFICE VISIT (OUTPATIENT)
Dept: URGENT CARE | Facility: CLINIC | Age: 22
End: 2022-11-13
Payer: MEDICAID

## 2022-11-13 VITALS
HEIGHT: 60 IN | TEMPERATURE: 99 F | HEART RATE: 112 BPM | SYSTOLIC BLOOD PRESSURE: 98 MMHG | OXYGEN SATURATION: 98 % | WEIGHT: 116 LBS | DIASTOLIC BLOOD PRESSURE: 64 MMHG | RESPIRATION RATE: 16 BRPM | BODY MASS INDEX: 22.78 KG/M2

## 2022-11-13 DIAGNOSIS — J02.9 SORE THROAT: ICD-10-CM

## 2022-11-13 DIAGNOSIS — J02.0 STREP PHARYNGITIS: Primary | ICD-10-CM

## 2022-11-13 LAB
CTP QC/QA: YES
CTP QC/QA: YES
MOLECULAR STREP A: POSITIVE
POC MOLECULAR INFLUENZA A AGN: NEGATIVE
POC MOLECULAR INFLUENZA B AGN: NEGATIVE

## 2022-11-13 PROCEDURE — 3008F BODY MASS INDEX DOCD: CPT | Mod: CPTII,S$GLB,, | Performed by: PHYSICIAN ASSISTANT

## 2022-11-13 PROCEDURE — 3074F PR MOST RECENT SYSTOLIC BLOOD PRESSURE < 130 MM HG: ICD-10-PCS | Mod: CPTII,S$GLB,, | Performed by: PHYSICIAN ASSISTANT

## 2022-11-13 PROCEDURE — 3078F DIAST BP <80 MM HG: CPT | Mod: CPTII,S$GLB,, | Performed by: PHYSICIAN ASSISTANT

## 2022-11-13 PROCEDURE — 3074F SYST BP LT 130 MM HG: CPT | Mod: CPTII,S$GLB,, | Performed by: PHYSICIAN ASSISTANT

## 2022-11-13 PROCEDURE — 3078F PR MOST RECENT DIASTOLIC BLOOD PRESSURE < 80 MM HG: ICD-10-PCS | Mod: CPTII,S$GLB,, | Performed by: PHYSICIAN ASSISTANT

## 2022-11-13 PROCEDURE — 3008F PR BODY MASS INDEX (BMI) DOCUMENTED: ICD-10-PCS | Mod: CPTII,S$GLB,, | Performed by: PHYSICIAN ASSISTANT

## 2022-11-13 PROCEDURE — 99213 OFFICE O/P EST LOW 20 MIN: CPT | Mod: S$GLB,,, | Performed by: PHYSICIAN ASSISTANT

## 2022-11-13 PROCEDURE — 87502 POCT INFLUENZA A/B MOLECULAR: ICD-10-PCS | Mod: QW,S$GLB,, | Performed by: PHYSICIAN ASSISTANT

## 2022-11-13 PROCEDURE — 87651 STREP A DNA AMP PROBE: CPT | Mod: QW,S$GLB,, | Performed by: PHYSICIAN ASSISTANT

## 2022-11-13 PROCEDURE — 99213 PR OFFICE/OUTPT VISIT, EST, LEVL III, 20-29 MIN: ICD-10-PCS | Mod: S$GLB,,, | Performed by: PHYSICIAN ASSISTANT

## 2022-11-13 PROCEDURE — 87502 INFLUENZA DNA AMP PROBE: CPT | Mod: QW,S$GLB,, | Performed by: PHYSICIAN ASSISTANT

## 2022-11-13 PROCEDURE — 87651 POCT STREP A MOLECULAR: ICD-10-PCS | Mod: QW,S$GLB,, | Performed by: PHYSICIAN ASSISTANT

## 2022-11-13 PROCEDURE — 1159F MED LIST DOCD IN RCRD: CPT | Mod: CPTII,S$GLB,, | Performed by: PHYSICIAN ASSISTANT

## 2022-11-13 PROCEDURE — 1159F PR MEDICATION LIST DOCUMENTED IN MEDICAL RECORD: ICD-10-PCS | Mod: CPTII,S$GLB,, | Performed by: PHYSICIAN ASSISTANT

## 2022-11-13 PROCEDURE — 1160F RVW MEDS BY RX/DR IN RCRD: CPT | Mod: CPTII,S$GLB,, | Performed by: PHYSICIAN ASSISTANT

## 2022-11-13 PROCEDURE — 1160F PR REVIEW ALL MEDS BY PRESCRIBER/CLIN PHARMACIST DOCUMENTED: ICD-10-PCS | Mod: CPTII,S$GLB,, | Performed by: PHYSICIAN ASSISTANT

## 2022-11-13 RX ORDER — AMOXICILLIN 500 MG/1
500 TABLET, FILM COATED ORAL EVERY 12 HOURS
Qty: 20 TABLET | Refills: 0 | Status: SHIPPED | OUTPATIENT
Start: 2022-11-13 | End: 2022-11-23

## 2022-11-13 RX ORDER — AMOXICILLIN 500 MG/1
500 CAPSULE ORAL EVERY 12 HOURS
Qty: 20 CAPSULE | Refills: 0 | Status: SHIPPED | OUTPATIENT
Start: 2022-11-13 | End: 2022-11-13 | Stop reason: CLARIF

## 2022-11-13 NOTE — PROGRESS NOTES
Subjective:       Patient ID: Rina Keene is a 22 y.o. female.    Vitals:  height is 5' (1.524 m) and weight is 52.6 kg (116 lb). Her oral temperature is 99.2 °F (37.3 °C). Her blood pressure is 98/64 and her pulse is 112 (abnormal). Her respiration is 16 and oxygen saturation is 98%.     Chief Complaint: Sore Throat    Patient is 14 weeks pregnant.      Sore Throat   This is a new problem. The current episode started yesterday. The problem has been waxing and waning. The maximum temperature recorded prior to her arrival was 100.4 - 100.9 F. The fever has been present for Less than 1 day. The pain is at a severity of 3/10. The pain is mild. Associated symptoms include congestion and trouble swallowing. Pertinent negatives include no abdominal pain, coughing, diarrhea, drooling, ear discharge, ear pain, headaches, hoarse voice, plugged ear sensation, neck pain, shortness of breath, stridor, swollen glands or vomiting. She has had exposure to strep. She has had no exposure to mono. Exposure to: flu. She has tried acetaminophen for the symptoms. The treatment provided mild relief.     Constitution: Positive for fever. Negative for chills, sweating and fatigue.   HENT:  Positive for congestion, postnasal drip, sore throat and trouble swallowing. Negative for ear pain, ear discharge, foreign body in ear, tinnitus, hearing loss, drooling, nosebleeds, foreign body in nose, sinus pain, sinus pressure and voice change.    Neck: Negative for neck pain, neck stiffness, painful lymph nodes and neck swelling.   Cardiovascular:  Negative for chest pain, leg swelling, palpitations, sob on exertion and passing out.   Eyes:  Negative for eye pain, eye redness, photophobia, double vision, blurred vision and eyelid swelling.   Respiratory:  Negative for chest tightness, cough, sputum production, bloody sputum, shortness of breath, stridor and wheezing.    Gastrointestinal:  Negative for abdominal pain, abdominal bloating,  nausea, vomiting, constipation, diarrhea and heartburn.   Musculoskeletal:  Negative for joint pain, joint swelling, abnormal ROM of joint, back pain, muscle cramps and muscle ache.   Skin:  Negative for rash and hives.   Allergic/Immunologic: Negative for seasonal allergies, food allergies, hives, itching and sneezing.   Neurological:  Negative for dizziness, light-headedness, passing out, loss of balance, headaches, altered mental status, loss of consciousness and seizures.   Hematologic/Lymphatic: Negative for swollen lymph nodes.   Psychiatric/Behavioral:  Negative for altered mental status and nervous/anxious. The patient is not nervous/anxious.      Objective:      Physical Exam   Constitutional: She is oriented to person, place, and time. She appears well-developed. She is cooperative.  Non-toxic appearance. She does not appear ill. No distress.   HENT:   Head: Normocephalic and atraumatic.   Ears:   Right Ear: Hearing, tympanic membrane, external ear and ear canal normal.   Left Ear: Hearing, tympanic membrane, external ear and ear canal normal.   Nose: Mucosal edema and rhinorrhea present. No nasal deformity. No epistaxis. Right sinus exhibits no maxillary sinus tenderness and no frontal sinus tenderness. Left sinus exhibits no maxillary sinus tenderness and no frontal sinus tenderness.   Mouth/Throat: Uvula is midline and mucous membranes are normal. No trismus in the jaw. Normal dentition. No uvula swelling. Posterior oropharyngeal erythema and cobblestoning present. No oropharyngeal exudate, posterior oropharyngeal edema or tonsillar abscesses. Tonsils are 2+ on the right. Tonsils are 1+ on the left. No tonsillar exudate.   Eyes: Conjunctivae and lids are normal. No scleral icterus.   Neck: Trachea normal and phonation normal. Neck supple. No edema present. No erythema present. No neck rigidity present.   Cardiovascular: Regular rhythm, normal heart sounds and normal pulses. Tachycardia present.    Pulmonary/Chest: Effort normal and breath sounds normal. No accessory muscle usage or stridor. No respiratory distress. She has no decreased breath sounds. She has no wheezes. She has no rhonchi. She has no rales.   Abdominal: Normal appearance.   Musculoskeletal: Normal range of motion.         General: No deformity. Normal range of motion.   Lymphadenopathy:     She has cervical adenopathy.        Right cervical: Superficial cervical adenopathy present.        Left cervical: Superficial cervical adenopathy present.   Neurological: She is alert and oriented to person, place, and time. She exhibits normal muscle tone. Coordination normal.   Skin: Skin is warm, dry, intact, not diaphoretic, not pale and no rash. Capillary refill takes less than 2 seconds.   Psychiatric: Her speech is normal and behavior is normal. Judgment and thought content normal.   Nursing note and vitals reviewed.      Results for orders placed or performed in visit on 11/13/22   POCT Influenza A/B MOLECULAR   Result Value Ref Range    POC Molecular Influenza A Ag Negative Negative, Not Reported    POC Molecular Influenza B Ag Negative Negative, Not Reported     Acceptable Yes    POCT Strep A, Molecular   Result Value Ref Range    Molecular Strep A, POC Positive (A) Negative     Acceptable Yes        Assessment:       1. Strep pharyngitis    2. Sore throat          Plan:     Discussed flu and strep results with patient. Advised close follow-up with PCP and/or Specialist for further evaluation as needed. ER precautions given to patient as well. Patient aware, verbalized understanding and agreed with plan of care.    Strep pharyngitis  -     Discontinue: amoxicillin (AMOXIL) 500 MG capsule; Take 1 capsule (500 mg total) by mouth every 12 (twelve) hours.  Dispense: 20 capsule; Refill: 0  -     amoxicillin (AMOXIL) 500 MG Tab; Take 1 tablet (500 mg total) by mouth every 12 (twelve) hours. for 10 days  Dispense: 20  tablet; Refill: 0    Sore throat  -     POCT Influenza A/B MOLECULAR  -     POCT Strep A, Molecular       Patient Instructions   You must understand that you've received an Urgent Care treatment only and that you may be released before all your medical problems are known or treated. You, the patient, will arrange for follow up care as instructed.  Follow up with your PCP or specialty clinic as directed if not improved or as needed. You can call [phone] to schedule an appointment with the appropriate provider.  If your condition worsens we recommend that you receive another evaluation at the Emergency Department for any concerns or worsening of condition.  Patient aware and verbalized understanding.    Reviewed strep and flu results with patient.  Take antibiotics to full completion as prescribed.  Increase fluids: Cool liquids as much as possible.   Avoid any foods or beverages that may cause irritation to the throat (spicy, acidic, rough, etc.).  Rest is important.  Avoid contact with sick individuals.  Humidifier use at home.  THROW AWAY TOOTHBRUSH AND START WITH NEW ONE AS DISCUSSED.  AVOID SHARING FOOD/DRINK AS DISCUSSED.   Can take OTC Claritin or Zyrtec or Allegra (plain) daily as needed for seasonal allergies/nasal congestion, etc.  Can take OTC Flonase Nasal [place] as needed for nasal congestion/seasonal allergies, etc.  Can take OTC Tylenol every 4 - 6 hours as needed for fever or pain, etc.  Follow-up with your PCP in the next 24-72hrs or sooner for re-evaluation especially if no improvement in symptoms.  ER precautions given to patient.  Patient aware, verbalized understanding and agreed with plan of care.

## 2022-11-13 NOTE — PATIENT INSTRUCTIONS
You must understand that you've received an Urgent Care treatment only and that you may be released before all your medical problems are known or treated. You, the patient, will arrange for follow up care as instructed.  Follow up with your PCP or specialty clinic as directed if not improved or as needed. You can call [phone] to schedule an appointment with the appropriate provider.  If your condition worsens we recommend that you receive another evaluation at the Emergency Department for any concerns or worsening of condition.  Patient aware and verbalized understanding.    Reviewed strep and flu results with patient.  Take antibiotics to full completion as prescribed.  Increase fluids: Cool liquids as much as possible.   Avoid any foods or beverages that may cause irritation to the throat (spicy, acidic, rough, etc.).  Rest is important.  Avoid contact with sick individuals.  Humidifier use at home.  THROW AWAY TOOTHBRUSH AND START WITH NEW ONE AS DISCUSSED.  AVOID SHARING FOOD/DRINK AS DISCUSSED.   Can take OTC Claritin or Zyrtec or Allegra (plain) daily as needed for seasonal allergies/nasal congestion, etc.  Can take OTC Flonase Nasal [place] as needed for nasal congestion/seasonal allergies, etc.  Can take OTC Tylenol every 4 - 6 hours as needed for fever or pain, etc.  Follow-up with your PCP in the next 24-72hrs or sooner for re-evaluation especially if no improvement in symptoms.  ER precautions given to patient.  Patient aware, verbalized understanding and agreed with plan of care.

## 2022-11-13 NOTE — LETTER
November 13, 2022      [facility]  [address] [place] [address], [address]  [place] [address]  Phone: [phone]  Fax: [phone]       Patient: Rina Keene   YOB: 2000  Date of Visit: 11/13/2022    To Whom It May Concern:    Lester Keene  was at [facility] on 11/13/2022. Please excuse patient for days missed from work. The patient may return to work on 11/15/2022. If you have any questions or concerns, or if I can be of further assistance, please do not hesitate to contact me.    Sincerely,      America Clarke PA-C

## 2022-11-18 ENCOUNTER — TELEPHONE (OUTPATIENT)
Dept: OBSTETRICS AND GYNECOLOGY | Facility: CLINIC | Age: 22
End: 2022-11-18
Payer: MEDICAID

## 2022-11-18 RX ORDER — TERCONAZOLE 8 MG/G
1 CREAM VAGINAL NIGHTLY
Qty: 20 G | Refills: 0 | Status: SHIPPED | OUTPATIENT
Start: 2022-11-18 | End: 2022-11-29

## 2022-11-18 NOTE — TELEPHONE ENCOUNTER
OB pt has symptoms of a yeast infection after taking abx for strep.  Requesting rx, recommended an appt if no improvement after using medication.     Terazol pended

## 2022-11-29 ENCOUNTER — PATIENT MESSAGE (OUTPATIENT)
Dept: OBSTETRICS AND GYNECOLOGY | Facility: CLINIC | Age: 22
End: 2022-11-29
Payer: MEDICAID

## 2022-11-29 ENCOUNTER — ROUTINE PRENATAL (OUTPATIENT)
Dept: OBSTETRICS AND GYNECOLOGY | Facility: CLINIC | Age: 22
End: 2022-11-29
Payer: MEDICAID

## 2022-11-29 VITALS
BODY MASS INDEX: 23.51 KG/M2 | SYSTOLIC BLOOD PRESSURE: 100 MMHG | WEIGHT: 120.38 LBS | DIASTOLIC BLOOD PRESSURE: 60 MMHG

## 2022-11-29 DIAGNOSIS — Z34.92 NORMAL PREGNANCY IN SECOND TRIMESTER: ICD-10-CM

## 2022-11-29 DIAGNOSIS — Z3A.15 15 WEEKS GESTATION OF PREGNANCY: Primary | ICD-10-CM

## 2022-11-29 PROCEDURE — 99212 OFFICE O/P EST SF 10 MIN: CPT | Mod: PBBFAC | Performed by: OBSTETRICS & GYNECOLOGY

## 2022-11-29 PROCEDURE — 99214 OFFICE O/P EST MOD 30 MIN: CPT | Mod: TH,S$PBB,, | Performed by: OBSTETRICS & GYNECOLOGY

## 2022-11-29 PROCEDURE — 99999 PR PBB SHADOW E&M-EST. PATIENT-LVL II: CPT | Mod: PBBFAC,,, | Performed by: OBSTETRICS & GYNECOLOGY

## 2022-11-29 PROCEDURE — 99999 PR PBB SHADOW E&M-EST. PATIENT-LVL II: ICD-10-PCS | Mod: PBBFAC,,, | Performed by: OBSTETRICS & GYNECOLOGY

## 2022-11-29 PROCEDURE — 99214 PR OFFICE/OUTPT VISIT, EST, LEVL IV, 30-39 MIN: ICD-10-PCS | Mod: TH,S$PBB,, | Performed by: OBSTETRICS & GYNECOLOGY

## 2022-11-29 RX ORDER — TERCONAZOLE 4 MG/G
1 CREAM VAGINAL NIGHTLY
Qty: 45 G | Refills: 0 | Status: SHIPPED | OUTPATIENT
Start: 2022-11-29 | End: 2022-12-06

## 2022-11-29 NOTE — PROGRESS NOTES
15w2d with complaints of vaginitis symptoms. Was treated with terazol 3 and symptoms are improved but not resolved. Will send in 7 day course.   N/V resolved. Declines genetic screening.   Anatomy scan scheduled for next month - pt does plan to find out gender.   Declines connected mom.   RTC in 4 weeks for routine PNC.

## 2022-11-30 ENCOUNTER — IMMUNIZATION (OUTPATIENT)
Dept: PHARMACY | Facility: CLINIC | Age: 22
End: 2022-11-30
Payer: MEDICAID

## 2022-12-14 ENCOUNTER — PATIENT MESSAGE (OUTPATIENT)
Dept: MATERNAL FETAL MEDICINE | Facility: CLINIC | Age: 22
End: 2022-12-14
Payer: MEDICAID

## 2022-12-15 ENCOUNTER — OFFICE VISIT (OUTPATIENT)
Dept: MATERNAL FETAL MEDICINE | Facility: CLINIC | Age: 22
End: 2022-12-15
Payer: MEDICAID

## 2022-12-15 ENCOUNTER — PROCEDURE VISIT (OUTPATIENT)
Dept: MATERNAL FETAL MEDICINE | Facility: CLINIC | Age: 22
End: 2022-12-15
Payer: MEDICAID

## 2022-12-15 DIAGNOSIS — O28.3 FETAL ECHOGENIC INTRACARDIAC FOCUS ON PRENATAL ULTRASOUND: Primary | ICD-10-CM

## 2022-12-15 DIAGNOSIS — Z34.90 EARLY STAGE OF PREGNANCY: ICD-10-CM

## 2022-12-15 PROCEDURE — 96040 PR GENETIC COUNSELING, EACH 30 MIN: CPT | Mod: ,,, | Performed by: GENETIC COUNSELOR, MS

## 2022-12-15 PROCEDURE — 76811 US MFM PROCEDURE (VIEWPOINT): ICD-10-PCS | Mod: 26,S$PBB,, | Performed by: OBSTETRICS & GYNECOLOGY

## 2022-12-15 PROCEDURE — 99499 UNLISTED E&M SERVICE: CPT | Mod: S$PBB,,, | Performed by: GENETIC COUNSELOR, MS

## 2022-12-15 PROCEDURE — 99499 NO LOS: ICD-10-PCS | Mod: S$PBB,,, | Performed by: GENETIC COUNSELOR, MS

## 2022-12-15 PROCEDURE — 96040 PR GENETIC COUNSELING, EACH 30 MIN: ICD-10-PCS | Mod: ,,, | Performed by: GENETIC COUNSELOR, MS

## 2022-12-15 PROCEDURE — 76811 OB US DETAILED SNGL FETUS: CPT | Mod: PBBFAC | Performed by: OBSTETRICS & GYNECOLOGY

## 2022-12-15 NOTE — PROGRESS NOTES
Office Visit - Genetic Counseling Evaluation   Rina Keene  : 2000  MRN: [id number]  PARTNER NAME: Telly    DATE OF SERVICE: 12/15/22  TIME SPENT: 20 min    REFERRING PROVIDER: Dr. America Ewing    REASON FOR CONSULT:  Rina Keene, a 22 y.o. female with an EIF noted on her recent fetal anatomic ultrasound was referred for genetic counseling to discuss screening for common fetal trisomies via [id number] cfDNA screening. She came to the appointment with her partner Telly and her sister.       OBSETRIC HISTORY   AGE AT EWA: 23y  EWA: 2023  GESTATION: [place]  GESTATIONAL AGE:17w4d    PREGNANCY HISTORY  G1: 38w6d-   G2:Current    MEDICAL HISTORY:    MEDICATIONS/EXPOSURES: Not discussed today    Patient Active Problem List   Diagnosis    Scoliosis    First pregnancy in adolescent 16 years of age or older in second trimester         Current Outpatient Medications:     fluticasone propionate (FLONASE) 50 mcg/actuation nasal spray, 2 sprays by Each Nostril route as needed., Disp: , Rfl:     prenatal 25/iron fum/folic/dha (PRENATAL-1 ORAL), Take by mouth., Disp: , Rfl:      FAMILY HISTORY:  Please see scanned pedigree in patient's chart under media. Rina has one son with a previous partner, he is 5y and is doing well. She has three sisters, one of whom has a history of seizures, they are otherwise healthy as are her parents. Her partner Telly does not have any children and has one healthy brother, his mother is also doing well. His father passed away in his early 40's from suicide.     Patient's ancestry is /[place]. FOB ancestry is Unknown. Consanguinity was denied.     Additional history negative for multiple miscarriages/stillbirth, developmental delay/intellectual disability, and known genetic disorders. Complete pedigree will be linked to this encounter and can be viewed under the Media tab. The information provided is based on the patient and/or their reproductive partner's  recollection of the family history and in the absence of complete medical records. If the family history changes or if more information is obtained, they were asked to contact us as this may alter the recommendations or impression of the family history.     PAST TESTING  Patient carrier screening: None in chart     Reproductive partner carrier screening: NA    Parental Karyotypes: NA    PREGNANCY TESTING  cfDNA for aneuploidy: Not completed    Diagnostic testing: NA    Fetal karyotype: NA    COUNSELING:   ECHOGENIC INTRACARDIAC FOCUS (EIF)  We discussed echogenic intracardac focus (EIF). This is not a structural abnormality that requires post-tsering follow-up and is not associated with congenital heart disease. It is usually seen as a normal variant in about 5% of pregnancies. There is a higher incidence of EIF in fetus's with Down Syndrome than in chromosomally typical fetus' therefore it is considered to be risk factor for Down syndrome (Likelihood ratio of 2). If isolated, the overwhelming majority (99%) of fetuses found to have an EIF will not have Down syndrome.      In a woman with other risk factors for Down syndrome (advanced maternal age, elevated quad screen risk or presence of other markers), the presence of an echogenic focus may increase the relative risk of Down syndrome. If the patient is close to age 35, the presence of an echogenic focus may increase the risk for Down syndrome to a level seen in women 35 years old or older. In a younger woman (< age 35) or in a woman without other risk factors or markers for Down syndrome, the significance of an isolated echogenic focus is uncertain and often not significant.      We reviewed the options of NIPT and amniocentesis. We reviewed the sensitivity and specificity and detection rates for Trisomy 21 for each of these tests. We discussed a 1 in 900 risk of pregnancy related loss or complication with an amniocentesis    We discussed carrier screening including  goals and typical panels. We also discussed the differences between population based, ethnic based and pan-ethnic expanded carrier screening. We discussed that carrier screening for population-based diseases is standard of practice. Testing such as this would include cystic fibrosis, sickle cell disease, SMA and the hemoglobinopathies. We discussed  screening for the above conditions.     Family history may dictate the inclusion of Fragile X; when reviewing Rina's family history they shared that thereis not a family history of intellectual disability or other features of Fragile X Syndrome such as premature ovarian failure or autism in males. Ethnic background may also dictate the inclusion of enzyme testing for Kingston Sachs disease Rina indicated that her ethnic background is/is not suggestive of a higher carrier rate. Rina reported they have [place] ethnicity when discussing family history.    Autosomal recessive inheritance was reviewed with the family and we discussed a plan should Rina be identified as a carrier. Specifically, we reviewed that Rina's partner could subsequently be tested for any mutations in the same gene.     Given this information we discussed that this is considered a screen and that there are limitations to this testing including a false negative. Should the family be interested a residual risk can be provided after testing results to clarify this remaining chance.     The patient indicated that they did not want to proceed with carrier screening at this time.       DISCUSSION & IMPRESSION:  Rina is a 22 y.o. female with a recently noted EIF on fetal ultrasound and no prior aneuploidy screening. She was appropriately upset during this conversation and asked typical questions about the baby's heart. She expressed that she is interested in screening given the EIF and will complete this today.     TESTING OPTIONS    Diagnostic Testing: Amniocentesis    Carrier Screening: Core  carrier screening with the addition of Kingston-Sachs enzyme    Pregnancy Options: Termination was discussed only for anticipatory guidance    Recurrence Risk: Unknown    Procedures/labs DECLINED today: Amniocentesis and carrier screening were declined. Rina agreed to [id number] today.     Rina stated that she was interested in completing cfDNA screening, she does not want to know the sex of the baby.     We reviewed Rina's medical and family history. We discussed basics of genetics and common fetal trisomies such as Down Syndrome. Rina was understanding of the information discussed in clinic and all questions were answered.     Per the indication for referral this visit was conducted by a genetic counselor. This patient will not be seen by an [facility] physician and your patient will not be scheduled for additional visits. If you have questions or concerns about this please reach out to our clinic and let us know any additional concerns you hope to be addressed by the maternal fetal medicine physicians that may be out of the scope of this visit. Thank you for your referral and the opportunity to participate in the care of your patients.       RECOMMENDATIONS:  [id number] to be completed today  Consider carrier screening for future with the inclusion of Kingston Sachs enzyme    Leona Lawson MS, [facility]  Licensed, Certified Genetic Counselor  [facility]

## 2022-12-20 ENCOUNTER — TELEPHONE (OUTPATIENT)
Dept: MATERNAL FETAL MEDICINE | Facility: CLINIC | Age: 22
End: 2022-12-20
Payer: MEDICAID

## 2022-12-20 ENCOUNTER — PATIENT MESSAGE (OUTPATIENT)
Dept: OBSTETRICS AND GYNECOLOGY | Facility: CLINIC | Age: 22
End: 2022-12-20
Payer: MEDICAID

## 2022-12-20 NOTE — TELEPHONE ENCOUNTER
Patient has been notified of NEGATIVE [id number] results. This specimen showed an expected representation of chromosomes 13, 18 and 21 material.    Patient does not want to know fetal sex at this time.       Pt verbalized understanding of information.

## 2022-12-27 ENCOUNTER — ROUTINE PRENATAL (OUTPATIENT)
Dept: OBSTETRICS AND GYNECOLOGY | Facility: CLINIC | Age: 22
End: 2022-12-27
Payer: MEDICAID

## 2022-12-27 VITALS
SYSTOLIC BLOOD PRESSURE: 100 MMHG | BODY MASS INDEX: 23.38 KG/M2 | DIASTOLIC BLOOD PRESSURE: 60 MMHG | WEIGHT: 119.69 LBS

## 2022-12-27 DIAGNOSIS — Z34.92 NORMAL PREGNANCY IN SECOND TRIMESTER: Primary | ICD-10-CM

## 2022-12-27 DIAGNOSIS — Z3A.20 20 WEEKS GESTATION OF PREGNANCY: ICD-10-CM

## 2022-12-27 PROCEDURE — 99212 OFFICE O/P EST SF 10 MIN: CPT | Mod: PBBFAC | Performed by: OBSTETRICS & GYNECOLOGY

## 2022-12-27 PROCEDURE — 99999 PR PBB SHADOW E&M-EST. PATIENT-LVL II: ICD-10-PCS | Mod: PBBFAC,,, | Performed by: OBSTETRICS & GYNECOLOGY

## 2022-12-27 PROCEDURE — 99213 OFFICE O/P EST LOW 20 MIN: CPT | Mod: TH,S$PBB,, | Performed by: OBSTETRICS & GYNECOLOGY

## 2022-12-27 PROCEDURE — 99999 PR PBB SHADOW E&M-EST. PATIENT-LVL II: CPT | Mod: PBBFAC,,, | Performed by: OBSTETRICS & GYNECOLOGY

## 2022-12-27 PROCEDURE — 99213 PR OFFICE/OUTPT VISIT, EST, LEVL III, 20-29 MIN: ICD-10-PCS | Mod: TH,S$PBB,, | Performed by: OBSTETRICS & GYNECOLOGY

## 2022-12-27 NOTE — PROGRESS NOTES
20w5d without major complaints.   EIF noted on anatomy scan. Mat21 then done which was WNL.   1 hour GCT and CBC next visit.   RTC in 4 weeks for routine PNC.     I spent a total of 20 minutes on the day of the visit. This includes face to face time and non-face to face time preparing to see the patient (eg, review of tests with interpretation of results and explanation of results to patient), obtaining and/or reviewing separately obtained history, documenting clinical information, appropriate prenatal counseling, and care coordination.

## 2023-01-19 ENCOUNTER — PATIENT MESSAGE (OUTPATIENT)
Dept: OTHER | Facility: OTHER | Age: 23
End: 2023-01-19
Payer: MEDICAID

## 2023-01-23 ENCOUNTER — TELEPHONE (OUTPATIENT)
Dept: OBSTETRICS AND GYNECOLOGY | Facility: CLINIC | Age: 23
End: 2023-01-23

## 2023-01-23 DIAGNOSIS — Z34.90 PREGNANCY, UNSPECIFIED GESTATIONAL AGE: Primary | ICD-10-CM

## 2023-01-23 DIAGNOSIS — Z3A.24 24 WEEKS GESTATION OF PREGNANCY: Primary | ICD-10-CM

## 2023-01-23 NOTE — TELEPHONE ENCOUNTER
Glucose orders canceled.  It looks like she is already scheduled for her glucose with the orders already linked on 2/24.

## 2023-01-24 ENCOUNTER — ROUTINE PRENATAL (OUTPATIENT)
Dept: OBSTETRICS AND GYNECOLOGY | Facility: CLINIC | Age: 23
End: 2023-01-24
Payer: MEDICAID

## 2023-01-24 VITALS
SYSTOLIC BLOOD PRESSURE: 100 MMHG | WEIGHT: 127.19 LBS | BODY MASS INDEX: 24.84 KG/M2 | DIASTOLIC BLOOD PRESSURE: 62 MMHG

## 2023-01-24 DIAGNOSIS — O99.891 BACK PAIN AFFECTING PREGNANCY, ANTEPARTUM: ICD-10-CM

## 2023-01-24 DIAGNOSIS — Z34.92 NORMAL PREGNANCY IN SECOND TRIMESTER: Primary | ICD-10-CM

## 2023-01-24 DIAGNOSIS — M54.9 BACK PAIN AFFECTING PREGNANCY, ANTEPARTUM: ICD-10-CM

## 2023-01-24 DIAGNOSIS — Z3A.24 24 WEEKS GESTATION OF PREGNANCY: ICD-10-CM

## 2023-01-24 PROCEDURE — 99213 OFFICE O/P EST LOW 20 MIN: CPT | Mod: TH,S$PBB,, | Performed by: OBSTETRICS & GYNECOLOGY

## 2023-01-24 PROCEDURE — 99213 PR OFFICE/OUTPT VISIT, EST, LEVL III, 20-29 MIN: ICD-10-PCS | Mod: TH,S$PBB,, | Performed by: OBSTETRICS & GYNECOLOGY

## 2023-01-24 PROCEDURE — 99999 PR PBB SHADOW E&M-EST. PATIENT-LVL II: ICD-10-PCS | Mod: PBBFAC,,, | Performed by: OBSTETRICS & GYNECOLOGY

## 2023-01-24 PROCEDURE — 99999 PR PBB SHADOW E&M-EST. PATIENT-LVL II: CPT | Mod: PBBFAC,,, | Performed by: OBSTETRICS & GYNECOLOGY

## 2023-01-24 PROCEDURE — 99212 OFFICE O/P EST SF 10 MIN: CPT | Mod: PBBFAC | Performed by: OBSTETRICS & GYNECOLOGY

## 2023-01-24 NOTE — PROGRESS NOTES
"22w5d with complaints of severe lower back pain - central - which feels like her back needs to "pop". Has been going on for the past several days. Makes it feel like she cannot bend over.   PT referral placed.   1 hour GCT and CBC next visit.   RTC in 4 weeks for routine PNC.     I spent a total of 20 minutes on the day of the visit. This includes face to face time and non-face to face time preparing to see the patient (eg, review of tests with interpretation of results and explanation of results to patient), obtaining and/or reviewing separately obtained history, documenting clinical information, appropriate prenatal counseling, and care coordination.      "

## 2023-02-02 ENCOUNTER — PATIENT MESSAGE (OUTPATIENT)
Dept: OTHER | Facility: OTHER | Age: 23
End: 2023-02-02
Payer: MEDICAID

## 2023-02-03 ENCOUNTER — CLINICAL SUPPORT (OUTPATIENT)
Dept: REHABILITATION | Facility: HOSPITAL | Age: 23
End: 2023-02-03
Attending: OBSTETRICS & GYNECOLOGY
Payer: MEDICAID

## 2023-02-03 DIAGNOSIS — O99.891 BACK PAIN AFFECTING PREGNANCY, ANTEPARTUM: ICD-10-CM

## 2023-02-03 DIAGNOSIS — M54.9 BACK PAIN AFFECTING PREGNANCY, ANTEPARTUM: ICD-10-CM

## 2023-02-03 PROCEDURE — 97110 THERAPEUTIC EXERCISES: CPT | Mod: PO

## 2023-02-03 PROCEDURE — 97161 PT EVAL LOW COMPLEX 20 MIN: CPT | Mod: PO

## 2023-02-03 NOTE — PLAN OF CARE
"[facility]   Physical Therapy Initial Evaluation     Date: 2/3/2023   Name: Rina Keene  Clinic Number: [id number]    Therapy Diagnosis:   Encounter Diagnosis   Name Primary?    Back pain affecting pregnancy, antepartum      Physician: Jacqueline Perez.*    Physician Orders: PT Eval and Treat   Medical Diagnosis from Referral: O99.891,M54.9 (ICD-10-CM) - Back pain affecting pregnancy, antepartum  Evaluation Date: 2/3/2023  Authorization Period Expiration: 12/20/2023  Plan of Care Expiration: 4/28/2023  Progress Note Due: 3/3/2023  Visit # / Visits authorized: -/ 20   FOTO: tbd/ tbd     Precautions: Standard    Time In: 1400  Time Out: 1455  Total Appointment Time (timed & untimed codes): 55 minutes      SUBJECTIVE   Date of onset: 3 months ago    History of current condition - Rina reports: over the last couple of months she has had increasing pain in low back. Pt states around the third and fourth month "my hips felt like they were going to rip off." Pt is currently 6 months/26 weeks pregnant.  Pt states, last week, she was standing from a chair and heard/felt a pop in the low back that hurt in the moment but improved symtpoms following. Pt continues to feel pain in the lower back more on the L than the R.     Falls: 0    Imaging, none:     Prior Therapy: None  Social History:  lives with their family and lives with their spouse; lives between fiance and parents house,   Occupation: Office work  Prior Level of Function: IND. Pt states that her previous pregnancy had no issues with low back pain.  Current Level of Function: Decreased functional mobility, increased pain with bending standing, and walking.    Pain:  Current 2/10, worst 8/10, best 0/10   Location: left back  back - lumbar  Description: Aching, Dull, Tight, and Shooting  Aggravating Factors: Sitting, Bending, Walking, Extension, and Flexing  Easing Factors: relaxation, lying down, hot bath, and rest    Patients " "goals: "To stop the pain when I do stuff"     Medical History:   Past Medical History:   Diagnosis Date    Asthma, currently inactive     seasonal    Scoliosis        Surgical History:   Rina Keene  has a past surgical history that includes Vaginal delivery (2017) and [place] tooth extraction.    Medications:   Rina has a current medication list which includes the following prescription(s): fluticasone propionate, iron bis-gly/fa/c/b12/ca/succ, and prenatal 25/iron fum/folic/dha.    Allergies:   Review of patient's allergies indicates:  No Known Allergies       OBJECTIVE     Observation: Pt noted with mildly decreased gait speed and mild sway back posture with ambulation.    Posture:  Pt noted with round shoulders, and increased lumbar lordosis    Lumbar Range of Motion:    Limitations Pain   Flexion wnl   Pain at end range        Extension wnl   yes        Left Side Bending wnl Yes     Right Side Bending WNL no     L Lumbar rotation Limited  yes   R lumbar roation WNL Yes       Lower Extremity Strength  Right LE  Left LE    Quadriceps: 4/5 Quadriceps: 4/5   Hamstrings: 4-/5 Hamstrings: 4-/5   Iliospoas: 4-/5 Iliospoas: 3+/5   Hip extension:  3/5 Hip extension: 3/5   PGM: 3+/5 PGM: 3+/5   Hip ER:  3+/5 Hip ER: 4-/5   Hip IR: 4-/5 Hip IR: 4-/5   Ankle dorsiflexion: 4+/5 Ankle dorsiflexion: 4+/5   Ankle plantarflexion: 4+/5 Ankle plantarflexion: 4+/5     Sensation:Intact    Special Tests:  -SLR Test: +  -Repeated Flexion: -  -Repeated Ext: -  -Prone Instability Test: +  -Bridge Test: +  -Slump Test: -    SI Special Tests:   Distraction: -  Compression: + (relief of symptoms    Joint Mobility:   -Shear testing:-  -Segmental mobility testing:WNL    Palpation:   -Erector Spinae: -  -Multifidi activation: -        Limitation/Restriction for FOTO Eval Survey    Therapist reviewed FOTO scores for Rina Keene on 2/3/2023.   FOTO documents entered into [employer] - see Media section.    Limitation Score: TBD%   "       TREATMENT     Total Treatment time (time-based codes) separate from Evaluation: 40 minutes      Rina received the treatments listed below:      SI stabilization belt applied during therex 2/2 decreased SI stability as found above. Pt states improved pain with all therex with stabilization belt.    THERAPEUTIC EXERCISES to develop strength, endurance, and core stabilization for 40 minutes including    2x20 Posterior pelvic tilts  2x15 Bridging  2x10 SLR with on each side  2x15 Sidelying Hip ABD  2X15 sidelying Clam shells  2x15 Sit<>Stand    PATIENT EDUCATION AND HOME EXERCISES     Education provided:   - HEP  - Use of SI stabilization belt for functional mobility and and ADLs    Written Home Exercises Provided: yes. Exercises were reviewed and Rina was able to demonstrate them prior to the end of the session.  Rina demonstrated good  understanding of the education provided. See EMR under Patient Instructions for exercises provided during therapy sessions.    ASSESSMENT     Rina is a 22 y.o. female referred to outpatient Physical Therapy with a medical diagnosis of O99.891,M54.9 (ICD-10-CM) - Back pain affecting pregnancy, antepartum. Patient presents with Decreased function   -Increased pain   -Decreased pain free lumbar AROM   -Decreased UE strength (MMT)   -Impaired sitting posture   -Decreased participation in regular exercise routine  - (+) TTP: SI joint    Patient prognosis is Good.   Patientt will benefit from skilled outpatient Physical Therapy to address the deficits stated above and in the chart below, provide patient /family education, and to maximize patientt's level of independence.     Plan of care discussed with patient: Yes  Patient's spiritual, cultural and educational needs considered and patient is agreeable to the plan of care and goals as stated below:     Anticipated Barriers for therapy: Progressing pregnancy    Medical Necessity is demonstrated by the  following  History  Co-morbidities and personal factors that may impact the plan of care Co-morbidities:   Pregnancy    Personal Factors:   no deficits     low   Examination  Body Structures and Functions, activity limitations and participation restrictions that may impact the plan of care Body Regions:   back  lower extremities    Body Systems:    strength  balance  gait  transfers  transitions    Participation Restrictions:   Pregnancy precautions    Activity limitations:   Learning and applying knowledge  no deficits    General Tasks and Commands  no deficits    Communication  no deficits    Mobility  no deficits    Self care  no deficits    Domestic Life  no deficits    Interactions/Relationships  no deficits    Life Areas  no deficits    Community and Social Life  no deficits         mod   Clinical Presentation stable and uncomplicated low   Decision Making/ Complexity Score: low     GOALS: Short Term Goals:  4 weeks  1.Report decreased low back pain  < / =  2/10  to increase tolerance for functional mobility  2. Increase ROM by 10 degrees where limited in order to perform ADLs without difficulty.  3. Increase strength by 1/3 MMT grade in Hip ABD  to increase tolerance for ADL and work activities.  4. Pt to tolerate HEP to improve ROM and independence with ADL's    Long Term Goals: 6 weeks  1.Report decreased low back pain < / = 2/10  to increase tolerance for functional mobility  2.Patient goal: No pain when walking and standing  3.Increase strength to 4+/5 in  hip abd  to increase tolerance for ADL and work activities.      PLAN   Plan of care Certification: 2/3/2023 to 4/28/2023.    Outpatient Physical Therapy 2 times weekly for 12 weeks to include the following interventions: Gait Training, Manual Therapy, Moist Heat/ Ice, Neuromuscular Re-ed, Patient Education, Self Care, Therapeutic Activities, and Therapeutic Exercise.     Richa Ferraro, PT      I CERTIFY THE NEED FOR THESE SERVICES FURNISHED UNDER THIS  PLAN OF TREATMENT AND WHILE UNDER MY CARE   Physician's comments:     Physician's Signature: ___________________________________________________

## 2023-02-07 ENCOUNTER — CLINICAL SUPPORT (OUTPATIENT)
Dept: REHABILITATION | Facility: HOSPITAL | Age: 23
End: 2023-02-07
Attending: OBSTETRICS & GYNECOLOGY
Payer: MEDICAID

## 2023-02-07 DIAGNOSIS — M54.9 DORSALGIA: Primary | ICD-10-CM

## 2023-02-07 PROCEDURE — 97110 THERAPEUTIC EXERCISES: CPT | Mod: PO

## 2023-02-07 NOTE — PROGRESS NOTES
"[facility]   Physical Therapy Treatment Note     Name: Rina Keene  Clinic Number: [id number]    Therapy Diagnosis: No diagnosis found.  Physician: Jacqueline Perez.*    Visit Date: 2/7/2023    Physician Orders: PT Eval and Treat   Medical Diagnosis from Referral: O99.891,M54.9 (ICD-10-CM) - Back pain affecting pregnancy, antepartum  Evaluation Date: 2/3/2023  Authorization Period Expiration: 12/20/2023  Plan of Care Expiration: 4/28/2023  Progress Note Due: 3/3/2023  Visit # / Visits authorized: 2/20   FOTO: TBD/TBD  Precautions: Standard  PTA Visit #: 0/5     Time In: ***  Time Out: ***  Total Billable Time: *** minutes    SUBJECTIVE     Patient reports ***    She {Actions; was/was not:72034} compliant with home exercise program.  Response to previous treatment: Initial follow up visit  Functional change: Ongoing    Pain: {0-10:13250::"0"}/10  Location: left lower back    OBJECTIVE     Objective Measures updated at progress report unless specified.     Treatment     Rina received the treatments listed below:      therapeutic exercises to develop strength, endurance, ROM, flexibility, posture, and core stabilization for *** minutes including:  2x20 Posterior pelvic tilts  2x15 Bridging  2x15 Sidelying Hip ABD  2x15 Sidelying Clam shells  2x15 Sit<>Stand    manual therapy techniques: {AMB PT PROGRESS MANUAL THERAPY:82538} were applied to the: *** for *** minutes, including:  ***    neuromuscular re-education activities to improve: {AMB PT PROGRESS NEURO RE-ED:42299} for *** minutes. The following activities were included:  2x50' lateral band walks  2x50' monster walks forwards    therapeutic activities to improve functional performance for ***  minutes, including:  ***    hot pack for *** minutes to ***.    Patient Education and Home Exercises     Home Exercises Provided and Patient Education Provided     Education provided:   - ***    Written Home Exercises Provided: {Blank " "single:87561::"yes","Patient instructed to cont prior HEP"}. Exercises were reviewed and Rina was able to demonstrate them prior to the end of the session.  Rina demonstrated good  understanding of the education provided. See EMR under Patient Instructions for exercises provided during therapy sessions    ASSESSMENT     ***    Rina {IS/IS NOT:42965} progressing well towards her goals.    Pt prognosis is good.    Pt will continue to benefit from skilled outpatient physical therapy to address the deficits listed in the problem list box on initial evaluation, provide pt/family education and to maximize pt's level of independence in the home and community environment.     Pt's spiritual, cultural and educational needs considered and pt agreeable to plan of care and goals.     Anticipated barriers to physical therapy: progressing pregnancy    Goals:  Short Term Goals: 4 weeks  1.Report decreased low back pain  < / =  2/10  to increase tolerance for functional mobility  2. Increase ROM by 10 degrees where limited in order to perform ADLs without difficulty.  3. Increase strength by 1/3 MMT grade in Hip ABD  to increase tolerance for ADL and work activities.  4. Pt to tolerate HEP to improve ROM and independence with ADL's     Long Term Goals: 6 weeks  1.Report decreased low back pain < / = 2/10  to increase tolerance for functional mobility  2.Patient goal: No pain when walking and standing  3.Increase strength to 4+/5 in  hip abd  to increase tolerance for ADL and work activities.    PLAN     Continue progressing per patient tolerance and POC.    Fred Beatty, PT, DPT    "

## 2023-02-07 NOTE — PROGRESS NOTES
"[facility]   Physical Therapy Treatment Note     Name: Rina Keene  Clinic Number: [id number]    Therapy Diagnosis: No diagnosis found.  Physician: Jacqueline Perez*    Visit Date: 2/7/2023    Physician Orders: PT Eval and Treat   Medical Diagnosis from Referral: O99.891,M54.9 (ICD-10-CM) - Back pain affecting pregnancy, antepartum  Evaluation Date: 2/3/2023  Authorization Period Expiration: 12/20/2023  Plan of Care Expiration: 4/28/2023  Progress Note Due: 3/3/2023  Visit # / Visits authorized: 2/20   FOTO: TBD/TBD  Precautions: Standard  PTA Visit #: 0/5     Time In: 5:10 pm  Time Out: 6:10 pm  Total Billable Time: 60 minutes    SUBJECTIVE     Patient reports that her lower back "is not even really hurting today" since starting her HEP. States she ordered an SI belt, though it has not yet been delivered. She has been enjoying performing her exercises and would like to progress some of them today.    She was compliant with home exercise program.    Response to previous treatment: Initial follow up visit  Functional change: Ongoing    Pain: 1/10  Location: left lower back    OBJECTIVE     Objective Measures updated at progress report unless specified.     Treatment     Rina received the treatments listed below:      *25 minutes overlap time with private payor patient*    therapeutic exercises to develop strength, endurance, ROM, flexibility, posture, and core stabilization for 15 minutes including:  2x20 Posterior Pelvic Tilts  2x15 Bridging  2x15 Sidelying Hip Abduction  2x15 Sidelying Clam Shells, BTB    neuromuscular re-education activities to improve: Balance, Kinesthetic, Proprioception, and Posture for 35 minutes. The following activities were included:  2x50' lateral band walks, BTB at ankles  2x50' monster walks forwards, BTB at ankles  2x50' monster walks backwards, BTB at ankles  2x20 sit to stands with kettlebell, 20# KB  2x10 each LE, split stance kettlebell " "deadlifts, 20# KB    therapeutic activities to improve functional performance/patient education for 10  minutes, including:  Updated HEP, education on appropriate body positioning during pregnancy including avoiding prolonged time in supine position, and exercise rationale for meeting established PT goals    hot pack for 10 minutes to lumbar spine in side lying position (patient education provided during).    Patient Education and Home Exercises     Home Exercises Provided and Patient Education Provided     Education provided:   - Updated HEP  - Appropriate body positioning when pregnant, avoiding prolonged supine position    Written Home Exercises Provided: yes. Exercises were reviewed and Rina was able to demonstrate them prior to the end of the session.  Rina demonstrated good  understanding of the education provided. See EMR under Patient Instructions for exercises provided during therapy sessions    ASSESSMENT     Patient with excellent initial response to lumbopelvic strengthening exercises provided during initial evaluation. Session focused on progression of lumbopelvic strengthening and functional stabilization exercises, followed by patient education for appropriate body positioning during pregnancy to safely perform exercises at home. Patient with good tolerance of exercise progressions, reporting mild fatigue at end of session. Good response to moist heat at end of session to decreased self perceived "tightness" in her lower back and glutes. Provided patient with updated HEP at end of session which she verbalized understanding of.    Rina Is progressing well towards her goals.    Pt prognosis is good.    Pt will continue to benefit from skilled outpatient physical therapy to address the deficits listed in the problem list box on initial evaluation, provide pt/family education and to maximize pt's level of independence in the home and community environment.     Pt's spiritual, cultural and " educational needs considered and pt agreeable to plan of care and goals.     Anticipated barriers to physical therapy: progressing pregnancy    Goals:  Short Term Goals: 4 weeks  1.Report decreased low back pain  < / =  2/10  to increase tolerance for functional mobility  2. Increase ROM by 10 degrees where limited in order to perform ADLs without difficulty.  3. Increase strength by 1/3 MMT grade in Hip ABD  to increase tolerance for ADL and work activities.  4. Pt to tolerate HEP to improve ROM and independence with ADL's     Long Term Goals: 6 weeks  1.Report decreased low back pain < / = 2/10  to increase tolerance for functional mobility  2.Patient goal: No pain when walking and standing  3.Increase strength to 4+/5 in  hip abd  to increase tolerance for ADL and work activities.    PLAN     Continue progressing per patient tolerance and POC.    Fred Beatty, PT, DPT

## 2023-02-14 ENCOUNTER — CLINICAL SUPPORT (OUTPATIENT)
Dept: REHABILITATION | Facility: HOSPITAL | Age: 23
End: 2023-02-14
Attending: OBSTETRICS & GYNECOLOGY
Payer: MEDICAID

## 2023-02-14 DIAGNOSIS — M54.9 BACK PAIN AFFECTING PREGNANCY, ANTEPARTUM: Primary | ICD-10-CM

## 2023-02-14 DIAGNOSIS — O99.891 BACK PAIN AFFECTING PREGNANCY, ANTEPARTUM: Primary | ICD-10-CM

## 2023-02-16 ENCOUNTER — PATIENT MESSAGE (OUTPATIENT)
Dept: OTHER | Facility: OTHER | Age: 23
End: 2023-02-16
Payer: MEDICAID

## 2023-02-16 ENCOUNTER — CLINICAL SUPPORT (OUTPATIENT)
Dept: REHABILITATION | Facility: HOSPITAL | Age: 23
End: 2023-02-16
Attending: OBSTETRICS & GYNECOLOGY
Payer: MEDICAID

## 2023-02-16 DIAGNOSIS — M54.9 BACK PAIN AFFECTING PREGNANCY, ANTEPARTUM: Primary | ICD-10-CM

## 2023-02-16 DIAGNOSIS — O99.891 BACK PAIN AFFECTING PREGNANCY, ANTEPARTUM: Primary | ICD-10-CM

## 2023-02-16 PROCEDURE — 97110 THERAPEUTIC EXERCISES: CPT | Mod: PO

## 2023-02-16 NOTE — PROGRESS NOTES
[facility]   Physical Therapy Treatment Note     Name: Rina Keene  Clinic Number: [id number]    Therapy Diagnosis:        Encounter Diagnosis   Name Primary?    Back pain affecting pregnancy, antepartum       Physician: Jacqueline Perez*    Visit Date: 2/7/2023    Physician Orders: PT Eval and Treat   Medical Diagnosis from Referral: O99.891,M54.9 (ICD-10-CM) - Back pain affecting pregnancy, antepartum  Evaluation Date: 2/3/2023  Authorization Period Expiration: 12/20/2023  Plan of Care Expiration: 4/28/2023  Progress Note Due: 3/3/2023  Visit # / Visits authorized: 3/20   FOTO: TBD/TBD  Precautions: Standard  PTA Visit #: 0/5     Time In: 5:05 pm  Time Out: 6:00 pm  Total Billable Time: 55 minutes    SUBJECTIVE     Patient reports she has not received her sacroiliac belt in the mail despite having ordered it over almost two (2) weeks ago. 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    She was compliant with home exercise program.    Response to previous treatment: Initial follow up visit  Functional change: Ongoing    Pain: 3-4/10  Location: left lower back    OBJECTIVE     Objective Measures updated at progress report unless specified.     Treatment     Rina received the treatments listed below:      *Medicaid Payor: Bill all as Therapeutic Exercise*    Therapeutic exercises to develop strength, endurance, ROM, flexibility, posture, and core stabilization for 30 minutes including:    Recumbent Bike: 10 minutes, level 3, to improve lower extremity muscular endurance  2x20 Pelvic Tilts seated on PB (Forward/Backwards/CW/CCW) each direction  2x10 Bridging on PB (not performed)  2X10 Bridging with Marches  2X12 Quadruped Hip Extensions  2x12 Quadruped Bird Dogs  2x12 Quadruped Hydrant    Neuromuscular Re-education activities to improve: Balance, Kinesthetic,  Proprioception, and Posture for 25 minutes. The following activities were included:  2x15 Standing SL Hip ABD vs GTB  2X15 Standing SL Hip Ext vs GTB  2X15 Weightbearing hip hikes on each side    Manual Therapy to the left hip for 2 minutes:  Left hip long axis distraction in right side lying: 1x30 reps (decreased left hip pain with bird dog exercise afterwards)    Therapeutic Activities to improve functional performance for 13 minutes, including:  Kettlebell Squatting: 3x12 with 10#  Lexi Deadlifts: 3x12 with 10#  Patient education (see below)    Patient Education and Home Exercises     Home Exercises Provided and Patient Education Provided     Education provided:   - Use of lumbar support roll when driving    Written Home Exercises Provided: yes. Exercises were reviewed and Rina was able to demonstrate them prior to the end of the session.  Rina demonstrated good  understanding of the education provided. 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 Educated patient on use of lumbar support roll to assist with decreasing pain with prolonged sitting which she verbalized understanding of. Session focused on progression of functional strengthening with addition of squatting and Lexi deadlifts, both of which she tolerated well. Patient reported mild increased in left hip pain with bird dog exercise which was alleviated with left hip long axis distraction. Instructed patient to continue with current HEP which she verbalized understanding of.    Rina Is progressing well towards her goals.    Pt prognosis is good.    Pt will continue to benefit from skilled outpatient physical therapy to address the deficits listed in the problem list box on initial evaluation, provide pt/family education and to maximize pt's level of  independence in the home and community environment.     Pt's spiritual, cultural and educational needs considered and pt agreeable to plan of care and goals.     Anticipated barriers to physical therapy: progressing pregnancy    Goals:  Short Term Goals: 4 weeks  1.Report decreased low back pain  < / =  2/10  to increase tolerance for functional mobility -Progressing  2. Increase ROM by 10 degrees where limited in order to perform ADLs without difficulty. -Progressing  3. Increase strength by 1/3 MMT grade in Hip ABD  to increase tolerance for ADL and work activities. -Progressing  4. Pt to tolerate HEP to improve ROM and independence with ADL's -Progressing     Long Term Goals: 6 weeks  1.Report decreased low back pain < / = 2/10  to increase tolerance for functional mobility -Progressing  2.Patient goal: No pain when walking and standing -Progressing  3.Increase strength to 4+/5 in  hip abd  to increase tolerance for ADL and work activities. -Progressing    PLAN     Continue progressing per patient tolerance and POC.    Fred Beatty, PT, DPT

## 2023-02-23 ENCOUNTER — CLINICAL SUPPORT (OUTPATIENT)
Dept: REHABILITATION | Facility: HOSPITAL | Age: 23
End: 2023-02-23
Attending: OBSTETRICS & GYNECOLOGY
Payer: MEDICAID

## 2023-02-23 DIAGNOSIS — O99.891 BACK PAIN AFFECTING PREGNANCY, ANTEPARTUM: Primary | ICD-10-CM

## 2023-02-23 DIAGNOSIS — M54.9 BACK PAIN AFFECTING PREGNANCY, ANTEPARTUM: Primary | ICD-10-CM

## 2023-02-23 PROCEDURE — 97110 THERAPEUTIC EXERCISES: CPT | Mod: PO

## 2023-02-23 NOTE — PROGRESS NOTES
[facility]   Physical Therapy Treatment Note     Name: Rina Keene  Clinic Number: [id number]    Therapy Diagnosis:        Encounter Diagnosis   Name Primary?    Back pain affecting pregnancy, antepartum       Physician: Jacqueline Perez*    Visit Date: 2/7/2023    Physician Orders: PT Eval and Treat   Medical Diagnosis from Referral: O99.891,M54.9 (ICD-10-CM) - Back pain affecting pregnancy, antepartum  Evaluation Date: 2/3/2023  Authorization Period Expiration: 12/20/2023  Plan of Care Expiration: 4/28/2023  Progress Note Due: 3/3/2023  Visit # / Visits authorized: 4/20   FOTO: TBD/TBD  Precautions: Standard  PTA Visit #: 0/5     Time In: 5:05 pm  Time Out: 6:00 pm  Total Billable Time: 55 minutes    SUBJECTIVE     Patient reports she received her sacroiliac belt in the mail and brought it with her to her appointment today for instruction in proper fitting and wearing schedule. States she made a lumbar support roll at home for use in the car, though she believes she made it too big as it is uncomfortable.    She was compliant with home exercise program.    Response to previous treatment: Decreased lower back pain.  Functional change: Improved sitting and walking tolerance.    Pain: 2/10  Location: Left lower back.    OBJECTIVE     Objective Measures updated at progress report unless specified.     Treatment     Rina received the treatments listed below:      *Medicaid Payor: Bill all as Therapeutic Exercise*    Therapeutic Activities to improve functional performance for 45 minutes, including:    Upright Bike: 10 minutes, level 3, to improve activity tolerance  Sacroiliac Belt: proper fitting of belt, bony landmarks, purpose, and wearing/weaning schedule  Lexi Deadlifts: 3x12 with 10#  Goblet Squat: 3x12 with 15#  Lateral Band Walks: 2x40ft with GTB at ankles  Monster Walks - Forwards: 2x40ft with GTB at ankles  Monster Walks - Backwards: 2x40ft with GTB at  ankles    Manual Therapy to the left hip for 2 minutes:  Left hip long axis distraction in right side lying: 1x30 reps (decreased left hip pain afterwards)    Neuromuscular Re-education activities to improve: Balance, Kinesthetic, Proprioception, and Posture for 8 minutes. The following activities were included:    Single Leg Stance with Dual Motor Task (Ball Pass with Partner): 2x30 seconds each lower extremity  Single Leg Stance - Rebounder - Forwards: 2x10 passes each lower extremity, red ball  Single Leg Stance - Rebounder - Lateral: 2x10 passes each lower extremity, red ball    Therapeutic exercises to develop strength, endurance, ROM, flexibility, posture, and core stabilization for 0 minutes including:  2x20 Pelvic Tilts seated on PB (Forward/Backwards/CW/CCW) each direction (not performed)  2x10 Bridging on PB (not performed)  2X10 Bridging with Marches (not performed)  2X12 Quadruped Hip Extensions (not performed)  2x12 Quadruped Bird Dogs (not performed)  2x12 Quadruped Hydrant (not performed)  2x15 Standing SL Hip ABD vs GTB (not performed)  2X15 Standing SL Hip Ext vs GTB (not performed)  2X15 Weightbearing hip hikes on each side (not performed)        Patient Education and Home Exercises     Home Exercises Provided and Patient Education Provided     Education provided:   - HEP review    Written Home Exercises Provided: yes. Exercises were reviewed and Rina was able to demonstrate them prior to the end of the session.  Rina demonstrated good  understanding of the education provided. See EMR under Patient Instructions for exercises provided during therapy sessions    ASSESSMENT     Session focused on patient education and instruction in proper fitting of her sacroiliac belt, as well as instructions for how often to wear it during the day and a proposed schedule for weaning off in the future, all of which she verbalized understanding to and demonstrated a good initial grasp of. Patient opting to wear  sacroiliac belt throughout session today, and had excellent tolerance overall of new exercises and exercise progressions, reporting no adverse effects during treatments. She continues to complain of mild left sided sacroiliac joint pain at the start of each session, which is relieved with gentle long axis hip distractions performed in right side lying. Added single leg stance stabilization exercises to exercise flow today with dual motor task which she also tolerated well, demonstrating primarily decreased ankle stability during performance. Instructed patient to continue with current HEP which she verbalized understanding of.    Rina Is progressing well towards her goals.    Pt prognosis is good.    Pt will continue to benefit from skilled outpatient physical therapy to address the deficits listed in the problem list box on initial evaluation, provide pt/family education and to maximize pt's level of independence in the home and community environment.     Pt's spiritual, cultural and educational needs considered and pt agreeable to plan of care and goals.     Anticipated barriers to physical therapy: progressing pregnancy    Goals:  Short Term Goals: 4 weeks  1.Report decreased low back pain  < / =  2/10  to increase tolerance for functional mobility -Progressing  2. Increase ROM by 10 degrees where limited in order to perform ADLs without difficulty. -Progressing  3. Increase strength by 1/3 MMT grade in Hip ABD  to increase tolerance for ADL and work activities. -Progressing  4. Pt to tolerate HEP to improve ROM and independence with ADL's -Progressing     Long Term Goals: 6 weeks  1.Report decreased low back pain < / = 2/10  to increase tolerance for functional mobility -Progressing  2.Patient goal: No pain when walking and standing -Progressing  3.Increase strength to 4+/5 in  hip abd  to increase tolerance for ADL and work activities. -Progressing    PLAN     Continue progressing per patient tolerance and  POC.    Fred Beatty, PT, DPT

## 2023-02-24 ENCOUNTER — CLINICAL SUPPORT (OUTPATIENT)
Dept: OBSTETRICS AND GYNECOLOGY | Facility: CLINIC | Age: 23
End: 2023-02-24
Payer: MEDICAID

## 2023-02-24 ENCOUNTER — LAB VISIT (OUTPATIENT)
Dept: LAB | Facility: OTHER | Age: 23
End: 2023-02-24
Attending: OBSTETRICS & GYNECOLOGY
Payer: MEDICAID

## 2023-02-24 ENCOUNTER — ROUTINE PRENATAL (OUTPATIENT)
Dept: OBSTETRICS AND GYNECOLOGY | Facility: CLINIC | Age: 23
End: 2023-02-24
Payer: MEDICAID

## 2023-02-24 VITALS — DIASTOLIC BLOOD PRESSURE: 56 MMHG | BODY MASS INDEX: 25.02 KG/M2 | SYSTOLIC BLOOD PRESSURE: 98 MMHG | WEIGHT: 128.06 LBS

## 2023-02-24 DIAGNOSIS — Z3A.29 29 WEEKS GESTATION OF PREGNANCY: ICD-10-CM

## 2023-02-24 DIAGNOSIS — Z3A.15 15 WEEKS GESTATION OF PREGNANCY: ICD-10-CM

## 2023-02-24 DIAGNOSIS — Z34.92 NORMAL PREGNANCY IN SECOND TRIMESTER: Primary | ICD-10-CM

## 2023-02-24 DIAGNOSIS — Z23 NEED FOR TDAP VACCINATION: Primary | ICD-10-CM

## 2023-02-24 LAB
BASOPHILS # BLD AUTO: 0.01 K/UL (ref 0–0.2)
BASOPHILS NFR BLD: 0.2 % (ref 0–1.9)
DIFFERENTIAL METHOD: ABNORMAL
EOSINOPHIL # BLD AUTO: 0.1 K/UL (ref 0–0.5)
EOSINOPHIL NFR BLD: 1.1 % (ref 0–8)
ERYTHROCYTE [DISTWIDTH] IN BLOOD BY AUTOMATED COUNT: 12.2 % (ref 11.5–14.5)
GLUCOSE SERPL-MCNC: 77 MG/DL (ref 70–140)
HCT VFR BLD AUTO: 29.7 % (ref 37–48.5)
HGB BLD-MCNC: 10.3 G/DL (ref 12–16)
IMM GRANULOCYTES # BLD AUTO: 0.02 K/UL (ref 0–0.04)
IMM GRANULOCYTES NFR BLD AUTO: 0.4 % (ref 0–0.5)
LYMPHOCYTES # BLD AUTO: 1.5 K/UL (ref 1–4.8)
LYMPHOCYTES NFR BLD: 25.8 % (ref 18–48)
MCH RBC QN AUTO: 32.3 PG (ref 27–31)
MCHC RBC AUTO-ENTMCNC: 34.7 G/DL (ref 32–36)
MCV RBC AUTO: 93 FL (ref 82–98)
MONOCYTES # BLD AUTO: 0.5 K/UL (ref 0.3–1)
MONOCYTES NFR BLD: 9 % (ref 4–15)
NEUTROPHILS # BLD AUTO: 3.6 K/UL (ref 1.8–7.7)
NEUTROPHILS NFR BLD: 63.5 % (ref 38–73)
NRBC BLD-RTO: 0 /100 WBC
PLATELET # BLD AUTO: 245 K/UL (ref 150–450)
PMV BLD AUTO: 10.3 FL (ref 9.2–12.9)
RBC # BLD AUTO: 3.19 M/UL (ref 4–5.4)
WBC # BLD AUTO: 5.69 K/UL (ref 3.9–12.7)

## 2023-02-24 PROCEDURE — 82950 GLUCOSE TEST: CPT | Performed by: OBSTETRICS & GYNECOLOGY

## 2023-02-24 PROCEDURE — 99213 PR OFFICE/OUTPT VISIT, EST, LEVL III, 20-29 MIN: ICD-10-PCS | Mod: TH,S$PBB,, | Performed by: OBSTETRICS & GYNECOLOGY

## 2023-02-24 PROCEDURE — 99999 PR PBB SHADOW E&M-EST. PATIENT-LVL II: CPT | Mod: PBBFAC,,, | Performed by: OBSTETRICS & GYNECOLOGY

## 2023-02-24 PROCEDURE — 90471 IMMUNIZATION ADMIN: CPT | Mod: PBBFAC

## 2023-02-24 PROCEDURE — 85025 COMPLETE CBC W/AUTO DIFF WBC: CPT | Performed by: OBSTETRICS & GYNECOLOGY

## 2023-02-24 PROCEDURE — 99212 OFFICE O/P EST SF 10 MIN: CPT | Mod: PBBFAC | Performed by: OBSTETRICS & GYNECOLOGY

## 2023-02-24 PROCEDURE — 36415 COLL VENOUS BLD VENIPUNCTURE: CPT | Performed by: OBSTETRICS & GYNECOLOGY

## 2023-02-24 PROCEDURE — 90715 TDAP VACCINE 7 YRS/> IM: CPT | Mod: PBBFAC

## 2023-02-24 PROCEDURE — 99213 OFFICE O/P EST LOW 20 MIN: CPT | Mod: TH,S$PBB,, | Performed by: OBSTETRICS & GYNECOLOGY

## 2023-02-24 PROCEDURE — 99999 PR PBB SHADOW E&M-EST. PATIENT-LVL II: ICD-10-PCS | Mod: PBBFAC,,, | Performed by: OBSTETRICS & GYNECOLOGY

## 2023-02-24 RX ORDER — DOCUSATE SODIUM 100 MG/1
100 CAPSULE, LIQUID FILLED ORAL 2 TIMES DAILY
COMMUNITY
End: 2023-04-10

## 2023-02-24 RX ADMIN — TETANUS TOXOID, REDUCED DIPHTHERIA TOXOID AND ACELLULAR PERTUSSIS VACCINE, ADSORBED 0.5 ML: 5; 2.5; 8; 8; 2.5 SUSPENSION INTRAMUSCULAR at 02:02

## 2023-02-24 NOTE — PROGRESS NOTES
29w1d without major complaints.   Tdap given today.   1 hour GCT and CBC today.  RTC in 2 weeks for routine PNC.

## 2023-02-24 NOTE — PROGRESS NOTES
Ordering Provider: Dr. Perez    Pt received Tdap to LEFT deltoid.  Pt tolerated injection well.  Pt was instructed to wait 15 minutes to monitor for signs and symptoms of any reactions.  Pt has no further questions, comments, or concerns at this time.

## 2023-02-28 ENCOUNTER — CLINICAL SUPPORT (OUTPATIENT)
Dept: REHABILITATION | Facility: HOSPITAL | Age: 23
End: 2023-02-28
Attending: OBSTETRICS & GYNECOLOGY
Payer: MEDICAID

## 2023-02-28 DIAGNOSIS — O99.891 BACK PAIN AFFECTING PREGNANCY, ANTEPARTUM: Primary | ICD-10-CM

## 2023-02-28 DIAGNOSIS — M54.9 BACK PAIN AFFECTING PREGNANCY, ANTEPARTUM: Primary | ICD-10-CM

## 2023-02-28 PROCEDURE — 97110 THERAPEUTIC EXERCISES: CPT | Mod: PO

## 2023-02-28 NOTE — PROGRESS NOTES
[facility]   Physical Therapy Treatment Note     Name: Rina Keene  Clinic Number: [id number]    Therapy Diagnosis:        Encounter Diagnosis   Name Primary?    Back pain affecting pregnancy, antepartum       Physician: Jacqueline Perez.*    Visit Date: 2/7/2023    Physician Orders: PT Eval and Treat   Medical Diagnosis from Referral: O99.891,M54.9 (ICD-10-CM) - Back pain affecting pregnancy, antepartum  Evaluation Date: 2/3/2023  Authorization Period Expiration: 12/20/2023  Plan of Care Expiration: 4/28/2023  Progress Note Due: 3/3/2023  Visit # / Visits authorized: 4/20   FOTO: TBD/TBD  Precautions: Standard  PTA Visit #: 0/5     Time In: 5:05 pm  Time Out: 6:00 pm  Total Billable Time: 55 minutes    SUBJECTIVE     Patient reports improved pain during day, however states pain with prolonged sitting. Pt also states discomfort with placement of belt during sitting.    She was compliant with home exercise program.    Response to previous treatment: Decreased lower back pain.  Functional change: Improved sitting and walking tolerance.    Pain: 2/10  Location: Left lower back.    OBJECTIVE     Objective Measures updated at progress report unless specified.     Treatment     Rina received the treatments listed below:      Therapeutic Activities to improve functional performance for 15 minutes, including:    Upright Bike: 7 minutes, level 3, to improve activity tolerance  Lexi Deadlifts: 2x15 with 20#  Goblet Squat: 2x15 with 15#  Pt educated on improved placement of SI belt during sitting activities.    Neuromuscular Re-education activities to improve: Balance, Kinesthetic, Proprioception, and Posture for 25 minutes. The following activities were included:    Single Leg Stance - Rebounder - Forwards: 2x10 passes each lower extremity, red ball  Single Leg Stance - Rebounder - Lateral: 2x10 passes each lower extremity, red ball  2x15 Standing SL Hip ABD vs RTB on foam each  side  2X15 Standing SL Hip Ext vs RTB on foam each side    Therapeutic exercises to develop strength, endurance, ROM, flexibility, posture, and core stabilization for 15 minutes including:  2x20 Pelvic Tilts seated on PB (Forward/Backwards/CW/CCW) each direction   2X15 Weightbearing hip hikes on each side   2x15 Pallof press with Shoulder flexion above 90 each side      Patient Education and Home Exercises     Home Exercises Provided and Patient Education Provided     Education provided:   - HEP review    Written Home Exercises Provided: yes. Exercises were reviewed and Rina was able to demonstrate them prior to the end of the session.  Rina demonstrated good  understanding of the education provided. See EMR under Patient Instructions for exercises provided during therapy sessions    ASSESSMENT     Pt progressing well with core stabilization and strengthening. Pt educated on improved placement of SI belt during seated activities; pt states understanding. Pt continues to be limited by intermittent SI and low back pain with prolonged sitting and fatigue during the day; however pt noted with decreased frequency of pain.     Rina Is progressing well towards her goals.    Pt prognosis is good.    Pt will continue to benefit from skilled outpatient physical therapy to address the deficits listed in the problem list box on initial evaluation, provide pt/family education and to maximize pt's level of independence in the home and community environment.     Pt's spiritual, cultural and educational needs considered and pt agreeable to plan of care and goals.     Anticipated barriers to physical therapy: progressing pregnancy    Goals:  Short Term Goals: 4 weeks  1.Report decreased low back pain  < / =  2/10  to increase tolerance for functional mobility -Progressing  2. Increase ROM by 10 degrees where limited in order to perform ADLs without difficulty. -Progressing  3. Increase strength by 1/3 MMT grade in Hip ABD  to  increase tolerance for ADL and work activities. -Progressing  4. Pt to tolerate HEP to improve ROM and independence with ADL's -Progressing     Long Term Goals: 6 weeks  1.Report decreased low back pain < / = 2/10  to increase tolerance for functional mobility -Progressing  2.Patient goal: No pain when walking and standing -Progressing  3.Increase strength to 4+/5 in  hip abd  to increase tolerance for ADL and work activities. -Progressing    PLAN     Continue progressing per patient tolerance and POC.    Richa Ferraro, PT, DPT

## 2023-03-02 ENCOUNTER — PATIENT MESSAGE (OUTPATIENT)
Dept: OTHER | Facility: OTHER | Age: 23
End: 2023-03-02
Payer: MEDICAID

## 2023-03-02 ENCOUNTER — CLINICAL SUPPORT (OUTPATIENT)
Dept: REHABILITATION | Facility: HOSPITAL | Age: 23
End: 2023-03-02
Attending: OBSTETRICS & GYNECOLOGY
Payer: MEDICAID

## 2023-03-02 DIAGNOSIS — M54.9 BACK PAIN AFFECTING PREGNANCY, ANTEPARTUM: Primary | ICD-10-CM

## 2023-03-02 DIAGNOSIS — O99.891 BACK PAIN AFFECTING PREGNANCY, ANTEPARTUM: Primary | ICD-10-CM

## 2023-03-02 PROCEDURE — 97110 THERAPEUTIC EXERCISES: CPT | Mod: PO

## 2023-03-02 NOTE — PROGRESS NOTES
"[facility]   Physical Therapy Treatment Note     Name: Rina Keene  Clinic Number: [id number]    Therapy Diagnosis:        Encounter Diagnosis   Name Primary?    Back pain affecting pregnancy, antepartum       Physician: Jacqueline Perez*    Visit Date: 2/7/2023    Physician Orders: PT Eval and Treat   Medical Diagnosis from Referral: O99.891,M54.9 (ICD-10-CM) - Back pain affecting pregnancy, antepartum  Evaluation Date: 2/3/2023  Authorization Period Expiration: 12/20/2023  Plan of Care Expiration: 4/28/2023  Progress Note Due: 04/02/2023  Visit # / Visits authorized: 6/20   FOTO: TBD/TBD  Precautions: Standard  PTA Visit #: 0/5     Time In: 5:05 pm  Time Out: 6:00 pm  Total Billable Time: 55 minutes    SUBJECTIVE     Patient reporting "today was the best day I've had since I started physical therapy, I had almost no pain." States she feels 85% back to her PLOF.    She was compliant with home exercise program.    Response to previous treatment: Decreased lower back pain.  Functional change: Improved sitting and walking tolerance.    Pain: 0/10  Location: Left lower back.    OBJECTIVE     Objective Measures updated at progress report unless specified.    Lower Extremity Strength  Right LE   Left LE     Quadriceps: 4+/5 Quadriceps: 4+/5   Hamstrings: 4+/5 Hamstrings: 4+/5   Iliospoas: 4+/5 Iliospoas: 4+/5   Hip Extension:  4/5 Hip Extension: 4/5   Hip Abduction: 4/5 Hip Abduction: 4/5   Hip ER:  4-/5 Hip ER: 4-/5   Hip IR: 4-/5 Hip IR: 4-/5     Treatment     Rina received the treatments listed below:      *Medicaid Payor: Bill Only as Therapeutic Exercise*    Therapeutic Activities to improve functional performance for 10 minutes, including:  Upright Bike: 10 minutes, level 3, to improve activity tolerance  Single Leg Deadlifts: 2x15 with 20# (not today)    Neuromuscular Re-education activities to improve: Balance, Kinesthetic, Proprioception, and Posture for 35 minutes. The " following activities were included:  Single Leg Stance - Rebounder - Forwards: 2x10 passes each lower extremity, red ball  Single Leg Stance - Rebounder - Forwards - Foam Pad: 2x10 passes each lower extremity, red ball  Single Leg Stance - Rebounder - Lateral: 2x10 passes each lower extremity, red ball  BOSU Squats - Ball Side Down: 2x10 reps  Monster Walks - Forwards/Backwards: 2x25ft each, RTB at ankles  2x10 Standing SL Hip ABD vs RTB on foam each side  2x10 Standing SL Hip Ext vs RTB on foam each side    Therapeutic Exercises to develop strength, endurance, ROM, flexibility, posture, and core stabilization for 15 minutes including:  Subjective/Objective Measures (10 minutes)  2x20 Pelvic Tilts seated on PB (Forward/Backwards/CW/CCW) each direction (not today)  2x15 Weightbearing hip hikes on each side (not today)  2x15 Pallof Press with Shoulder Flexion Above 90 - Split Stance - Bilateral    Patient Education and Home Exercises     Home Exercises Provided and Patient Education Provided     Education provided:   - HEP review  - Explanation of Reassessment Results    Written Home Exercises Provided: Instructed to continue with prior HEP. Exercises were reviewed and Rina was able to demonstrate them prior to the end of the session.  Rina demonstrated good  understanding of the education provided. See EMR under Patient Instructions for exercises provided during therapy sessions    ASSESSMENT     Rina Keene is a 22 year old female referred to physical therapy due to low back pain in the presence of antepartum. The patient has made good progress since starting physical therapy and has met all of her short term goals at this, reporting decreased pain, demonstrating independence and compliance with HEP, and demonstrating improved hip strength. The patient continues to demonstrate mild hip and ankle weakness, as well as intermittent lower back with prolonged sitting and standing. She will continue to benefit  from skilled PT services to meet her remaining long term PT goals and return to as close to her PLOF as possible.    Session focused on progression of LE stabilization and functional strengthening. Patient with good tolerance overall reporting no adverse effects during treatment. Demonstrates excellent grasp of HEP exercises and did well with addition of BOSU ball squats today. Instructed patient to continue with current HEP which she verbalized understanding of.    Rina is progressing well towards her goals.    Pt prognosis is good.    Pt will continue to benefit from skilled outpatient physical therapy to address the deficits listed in the problem list box on initial evaluation, provide pt/family education and to maximize pt's level of independence in the home and community environment.     Pt's spiritual, cultural and educational needs considered and pt agreeable to plan of care and goals.     Anticipated barriers to physical therapy: progressing pregnancy    Goals:  Short Term Goals: 4 weeks  1.Report decreased low back pain  < / =  2/10  to increase tolerance for functional mobility (Met 03/02)  2. Increase ROM by 10 degrees where limited in order to perform ADLs without difficulty. (Met 03/02)  3. Increase strength by 1/3 MMT grade in Hip ABD  to increase tolerance for ADL and work activities. (Met 03/02)  4. Pt to tolerate HEP to improve ROM and independence with ADL's (Met 03/02)     Long Term Goals: 6 weeks  1.Report decreased low back pain < / = 2/10  to increase tolerance for functional mobility -Progressing  2.Patient goal: No pain when walking and standing -Progressing  3.Increase strength to 4+/5 in  hip abd  to increase tolerance for ADL and work activities. -Progressing    PLAN     Continue progressing per patient tolerance and POC.    Fred Beatty, PT, DPT

## 2023-03-07 ENCOUNTER — CLINICAL SUPPORT (OUTPATIENT)
Dept: REHABILITATION | Facility: HOSPITAL | Age: 23
End: 2023-03-07
Attending: OBSTETRICS & GYNECOLOGY
Payer: MEDICAID

## 2023-03-07 DIAGNOSIS — O99.891 BACK PAIN AFFECTING PREGNANCY, ANTEPARTUM: Primary | ICD-10-CM

## 2023-03-07 DIAGNOSIS — M54.9 BACK PAIN AFFECTING PREGNANCY, ANTEPARTUM: Primary | ICD-10-CM

## 2023-03-07 PROCEDURE — 97110 THERAPEUTIC EXERCISES: CPT | Mod: PO

## 2023-03-07 NOTE — PROGRESS NOTES
"[facility]   Physical Therapy Treatment Note     Name: Rina Hookerthisulema  Clinic Number: [id number]    Therapy Diagnosis:        Encounter Diagnosis   Name Primary?    Back pain affecting pregnancy, antepartum       Physician: Jacqueline Perez*    Visit Date: 2/7/2023    Physician Orders: PT Eval and Treat   Medical Diagnosis from Referral: O99.891,M54.9 (ICD-10-CM) - Back pain affecting pregnancy, antepartum  Evaluation Date: 2/3/2023  Authorization Period Expiration: 12/20/2023  Plan of Care Expiration: 4/28/2023  Progress Note Due: 04/02/2023  Visit # / Visits authorized: 7/20   FOTO: TBD/TBD  Precautions: Standard  PTA Visit #: 0/5     Time In: 5:05 pm  Time Out: 6:05 pm  Total Billable Time: 60 minutes    SUBJECTIVE     Patient reports she slept on the couch for "a few over" over the weekend, which caused her increased back pain the last two days.    She was compliant with home exercise program.    Response to previous treatment: No lower back or hip pain.  Functional change: Improved sitting and walking tolerance.    Pain: 1/10  Location: Left low back    OBJECTIVE     Objective Measures updated at progress report unless specified.    Treatment     Rina received the treatments listed below:      *Medicaid Payor: Bill Only as Therapeutic Exercise*    Therapeutic Activities to improve functional performance for 15 minutes, including:  Recumbent Bike: 10 minutes, level 4.0, to improve activity tolerance  Single Leg Deadlifts: 2x15 with 20# kettlebell    Neuromuscular Re-education activities to improve: Balance, Kinesthetic, Proprioception, and Posture for 45 minutes. The following activities were included:  Single Leg Stance - Rebounder - Forwards - Foam Pad: 2x10 passes each lower extremity, red ball  Single Leg Stance - Rebounder - Lateral - Foam Pad: 2x10 passes each lower extremity, red ball  BOSU Squats - Ball Side Down: 2x20 reps, 5# kettlebell  BOSU Paloff Press: 2x15 " reps, GTB  Hip Abduction Machine: 2x10 with 25#, to facilitate abductor muscle activation  Hip Extension Machine: 2x10 with 25#, to facilitate gluteal muscle activation  Monster Walks - Forwards/Backwards: 2x25ft each, RTB at ankles    Patient Education and Home Exercises     Home Exercises Provided and Patient Education Provided   Education provided:   - HEP review    Written Home Exercises Provided: Instructed to continue with prior HEP. Exercises were reviewed and Rina was able to demonstrate them prior to the end of the session. Rina demonstrated good  understanding of the education provided. See EMR under Patient Instructions for exercises provided during therapy sessions    ASSESSMENT     Session focused on progression of core stabilization and lumbopelvic strengthening. Patient with good ability to progress activities by combining Paloff press and BOSU ball, though did report that exercise was challenging. She independent with use of sacroiliac belt at home, and has been compliant with her wearing schedule. Instructed patient to continue with current HEP which she verbalized understanding of.    Rina is progressing well towards her goals.    Pt prognosis is good.    Pt will continue to benefit from skilled outpatient physical therapy to address the deficits listed in the problem list box on initial evaluation, provide pt/family education and to maximize pt's level of independence in the home and community environment.     Pt's spiritual, cultural and educational needs considered and pt agreeable to plan of care and goals.     Anticipated barriers to physical therapy: progressing pregnancy    Goals:  Short Term Goals: 4 weeks  1.Report decreased low back pain  < / =  2/10  to increase tolerance for functional mobility (Met 03/02)  2. Increase ROM by 10 degrees where limited in order to perform ADLs without difficulty. (Met 03/02)  3. Increase strength by 1/3 MMT grade in Hip ABD  to increase tolerance  for ADL and work activities. (Met 03/02)  4. Pt to tolerate HEP to improve ROM and independence with ADL's (Met 03/02)     Long Term Goals: 6 weeks  1.Report decreased low back pain < / = 2/10  to increase tolerance for functional mobility -Progressing  2.Patient goal: No pain when walking and standing -Progressing  3.Increase strength to 4+/5 in  hip abd  to increase tolerance for ADL and work activities. -Progressing    PLAN     Continue progressing per patient tolerance and POC.    Fred Beatty, PT, DPT

## 2023-03-09 ENCOUNTER — CLINICAL SUPPORT (OUTPATIENT)
Dept: REHABILITATION | Facility: HOSPITAL | Age: 23
End: 2023-03-09
Attending: OBSTETRICS & GYNECOLOGY
Payer: MEDICAID

## 2023-03-09 DIAGNOSIS — M54.9 BACK PAIN AFFECTING PREGNANCY, ANTEPARTUM: Primary | ICD-10-CM

## 2023-03-09 DIAGNOSIS — O99.891 BACK PAIN AFFECTING PREGNANCY, ANTEPARTUM: Primary | ICD-10-CM

## 2023-03-09 PROCEDURE — 97110 THERAPEUTIC EXERCISES: CPT | Mod: PO

## 2023-03-09 NOTE — PROGRESS NOTES
"[facility]   Physical Therapy Treatment Note     Name: Rina Hookerthisulema  Clinic Number: [id number]    Therapy Diagnosis:        Encounter Diagnosis   Name Primary?    Back pain affecting pregnancy, antepartum       Physician: Jacqueline Perez*    Visit Date: 2/7/2023    Physician Orders: PT Eval and Treat   Medical Diagnosis from Referral: O99.891,M54.9 (ICD-10-CM) - Back pain affecting pregnancy, antepartum  Evaluation Date: 2/3/2023  Authorization Period Expiration: 12/20/2023  Plan of Care Expiration: 4/28/2023  Progress Note Due: 04/02/2023  Visit # / Visits authorized: 8/20   FOTO: TBD/TBD  Precautions: Standard  PTA Visit #: 0/5     Time In: 5:05 pm  Time Out: 6:05 pm  Total Billable Time: 60 minutes    SUBJECTIVE     Patient reports she has no low back pain today, and states she is wearing her sacroiliac belt in the evenings when performing household chores, which has been helpful.    She was compliant with home exercise program.    Response to previous treatment: No lower back or hip pain.  Functional change: Improved sitting and walking tolerance.    Pain: 0/10  Location: Left low back    OBJECTIVE     Objective Measures updated at progress report unless specified.    Treatment     Rina received the treatments listed below:      *Medicaid Payor: [place] Only as Therapeutic Exercise*    Therapeutic Activities to improve functional performance for 20 minutes, including:  Recumbent Bike: 10 minutes, level 4.0, to improve activity tolerance  Starfish Lateral Step Ups - 6" Step: 2x15 reps, bilateral  Single Leg Deadlifts: 2x15 with 20# kettlebell    Neuromuscular Re-education activities to improve: Balance, Kinesthetic, Proprioception, and Posture for 40 minutes. The following activities were included:  Single Leg Stance - Rebounder - Forwards - Foam Pad: 1x20 passes each lower extremity, red ball  Single Leg Stance - Rebounder - Lateral - Foam Pad: 2x10 passes each lower " extremity, red ball  BOSU Squats - Ball Side Down: 3x10 reps, blue med ball  BOSU Chops - Ball Side Down: 1x10 reps, blue med ball, bilateral  4-Way Sliders: 2x5 rounds, bilateral  Hip Abduction Machine: 2x10 with 25#, to facilitate abductor muscle activation  Hip Extension Machine: 2x10 with 25#, to facilitate gluteal muscle activation  Monster Walks - Forwards/Backwards: 2x25ft each, RTB at ankles (not performed)    Patient Education and Home Exercises     Home Exercises Provided and Patient Education Provided   Education provided:   - HEP review    Written Home Exercises Provided: Instructed to continue with prior HEP. Exercises were reviewed and Rina was able to demonstrate them prior to the end of the session. Rina demonstrated good  understanding of the education provided. See EMR under Patient Instructions for exercises provided during therapy sessions    ASSESSMENT     Session focused on progression of lumbopelvic strengthening and stabilization exercises. Patient with good tolerance of session reporting no adverse effects during treatment. Added medicine ball chops on BOSU ball, which she had difficulty performing and required close SBA to complete safely. Overall she is continuing to respond well to physical therapy, reporting no pain today and tolerating exercises well. Instructed patient to continue with current HEP which she verbalized understanding of.    Rina is progressing well towards her goals.    Pt prognosis is good.    Pt will continue to benefit from skilled outpatient physical therapy to address the deficits listed in the problem list box on initial evaluation, provide pt/family education and to maximize pt's level of independence in the home and community environment.     Pt's spiritual, cultural and educational needs considered and pt agreeable to plan of care and goals.     Anticipated barriers to physical therapy: progressing pregnancy    Goals:  Short Term Goals: 4 weeks  1.Report  decreased low back pain  < / =  2/10  to increase tolerance for functional mobility (Met 03/02)  2. Increase ROM by 10 degrees where limited in order to perform ADLs without difficulty. (Met 03/02)  3. Increase strength by 1/3 MMT grade in Hip ABD  to increase tolerance for ADL and work activities. (Met 03/02)  4. Pt to tolerate HEP to improve ROM and independence with ADL's (Met 03/02)     Long Term Goals: 6 weeks  1.Report decreased low back pain < / = 2/10  to increase tolerance for functional mobility -Progressing  2.Patient goal: No pain when walking and standing -Progressing  3.Increase strength to 4+/5 in  hip abd  to increase tolerance for ADL and work activities. -Progressing    PLAN     Continue progressing per patient tolerance and POC.    rFed Beatty, PT, DPT

## 2023-03-16 ENCOUNTER — CLINICAL SUPPORT (OUTPATIENT)
Dept: REHABILITATION | Facility: HOSPITAL | Age: 23
End: 2023-03-16
Attending: OBSTETRICS & GYNECOLOGY
Payer: MEDICAID

## 2023-03-16 ENCOUNTER — PATIENT MESSAGE (OUTPATIENT)
Dept: OTHER | Facility: OTHER | Age: 23
End: 2023-03-16
Payer: MEDICAID

## 2023-03-16 DIAGNOSIS — O99.891 BACK PAIN AFFECTING PREGNANCY, ANTEPARTUM: Primary | ICD-10-CM

## 2023-03-16 DIAGNOSIS — M54.9 BACK PAIN AFFECTING PREGNANCY, ANTEPARTUM: Primary | ICD-10-CM

## 2023-03-16 PROCEDURE — 97110 THERAPEUTIC EXERCISES: CPT | Mod: PO

## 2023-03-16 NOTE — PROGRESS NOTES
"[facility]   Physical Therapy Treatment Note     Name: Rina Hookerthisulema  Clinic Number: [id number]    Therapy Diagnosis:        Encounter Diagnosis   Name Primary?    Back pain affecting pregnancy, antepartum       Physician: Jacqueline Perez*  Visit Date: 2/7/2023    Physician Orders: PT Eval and Treat   Medical Diagnosis from Referral: O99.891,M54.9 (ICD-10-CM) - Back pain affecting pregnancy, antepartum  Evaluation Date: 2/3/2023  Authorization Period Expiration: 12/20/2023  Plan of Care Expiration: 4/28/2023  Progress Note Due: 04/02/2023  Visit # / Visits authorized: 9/20   FOTO: TBD/TBD  Precautions: Standard  PTA Visit #: 0/5     Time In: 5:05 pm  Time Out: 6:05 pm  Total Billable Time: 60 minutes    SUBJECTIVE     Patient reports she had "one rough day" since her last visit, and states it is uncomfortable to lay on her right side, which she attributes to her approaching due date with her pregnancy.    She was compliant with home exercise program.    Response to previous treatment: No lower back or hip pain.  Functional change: Improved sitting and walking tolerance.    Pain: 0/10  Location: Left low back    OBJECTIVE     Objective Measures updated at progress report unless specified.    Treatment     Rina received the treatments listed below:      *Medicaid Payor: [place] Only as Therapeutic Exercise*    Therapeutic Activities to improve functional performance for 20 minutes, including:  Recumbent Bike: 10 minutes, rolling hills setting, to improve activity tolerance  Starfish Lateral Step Ups - 6" Step: 2x15 reps, bilateral, green ball in hand  Single Leg Deadlifts: 2x10 with 25# kettlebell    Neuromuscular Re-education activities to improve: Balance, Kinesthetic, Proprioception, and Posture for 40 minutes. The following activities were included:  Single Leg Stance - Rebounder - Forwards - Foam Pad: 1x20 passes each lower extremity, red ball  Single Leg Stance - Rebounder - " Lateral - Foam Pad: 1x20 passes each lower extremity, red ball  Single Leg 1/4 Squat Position with Kettlebell Side to Side Passes: 2x60 seconds each leg, 5# kettlebell  Star Excursion Sliders: 2x5 rounds, bilateral  BOSU Squats - Ball Side Down: 3x15 reps, 4# med ball  Cable Column Paloff Press + Overhead Press: 2x10, 10#, bilateral  Hip Abduction Machine: 2x10 with 25#, to facilitate abductor muscle activation  Hip Extension Machine: 2x10 with 25#, to facilitate gluteal muscle activation  TRX Bodyweight Rows: 3x10  Monster Walks - Forwards/Backwards: 2x25ft each, RTB at ankles (not performed)    Patient Education and Home Exercises     Home Exercises Provided and Patient Education Provided   Education provided:   - HEP review    Written Home Exercises Provided: Instructed to continue with prior HEP. Exercises were reviewed and Rina was able to demonstrate them prior to the end of the session. Rina demonstrated good  understanding of the education provided. See EMR under Patient Instructions for exercises provided during therapy sessions    ASSESSMENT     Session focused on  progressions in lumbopelvic strengthening which the patient tolerated well. Instructed patient to continue with current HEP and discussed decreasing frequency of visits to 1x/week which she was in agreement with. Added TRX rows for thoracolumbar stabilization and lat strengthening which she tolerated well without complaint of adverse effects. Instructed patient to continue with current HEP which she verbalized understanding of.    Rina is progressing well towards her goals.    Pt prognosis is good.    Pt will continue to benefit from skilled outpatient physical therapy to address the deficits listed in the problem list box on initial evaluation, provide pt/family education and to maximize pt's level of independence in the home and community environment.     Pt's spiritual, cultural and educational needs considered and pt agreeable to plan  of care and goals.     Anticipated barriers to physical therapy: progressing pregnancy    Goals:  Short Term Goals: 4 weeks  1.Report decreased low back pain  < / =  2/10  to increase tolerance for functional mobility (Met 03/02)  2. Increase ROM by 10 degrees where limited in order to perform ADLs without difficulty. (Met 03/02)  3. Increase strength by 1/3 MMT grade in Hip ABD  to increase tolerance for ADL and work activities. (Met 03/02)  4. Pt to tolerate HEP to improve ROM and independence with ADL's (Met 03/02)     Long Term Goals: 6 weeks  1.Report decreased low back pain < / = 2/10  to increase tolerance for functional mobility -Progressing  2.Patient goal: No pain when walking and standing -Progressing  3.Increase strength to 4+/5 in  hip abd  to increase tolerance for ADL and work activities. -Progressing    PLAN     Continue progressing per patient tolerance and POC.    Fred Beatty, PT, DPT

## 2023-03-20 ENCOUNTER — ROUTINE PRENATAL (OUTPATIENT)
Dept: OBSTETRICS AND GYNECOLOGY | Facility: CLINIC | Age: 23
End: 2023-03-20
Payer: MEDICAID

## 2023-03-20 VITALS
BODY MASS INDEX: 25.47 KG/M2 | WEIGHT: 130.38 LBS | SYSTOLIC BLOOD PRESSURE: 122 MMHG | DIASTOLIC BLOOD PRESSURE: 70 MMHG

## 2023-03-20 DIAGNOSIS — Z34.92 NORMAL PREGNANCY IN SECOND TRIMESTER: Primary | ICD-10-CM

## 2023-03-20 DIAGNOSIS — Z3A.32 32 WEEKS GESTATION OF PREGNANCY: ICD-10-CM

## 2023-03-20 DIAGNOSIS — O26.849 SIGNIFICANT DISCREPANCY BETWEEN UTERINE SIZE AND CLINICAL DATES, ANTEPARTUM: ICD-10-CM

## 2023-03-20 PROCEDURE — 99213 PR OFFICE/OUTPT VISIT, EST, LEVL III, 20-29 MIN: ICD-10-PCS | Mod: TH,S$PBB,, | Performed by: OBSTETRICS & GYNECOLOGY

## 2023-03-20 PROCEDURE — 99999 PR PBB SHADOW E&M-EST. PATIENT-LVL III: ICD-10-PCS | Mod: PBBFAC,,, | Performed by: OBSTETRICS & GYNECOLOGY

## 2023-03-20 PROCEDURE — 99999 PR PBB SHADOW E&M-EST. PATIENT-LVL III: CPT | Mod: PBBFAC,,, | Performed by: OBSTETRICS & GYNECOLOGY

## 2023-03-20 PROCEDURE — 99213 OFFICE O/P EST LOW 20 MIN: CPT | Mod: PBBFAC,TH,PN | Performed by: OBSTETRICS & GYNECOLOGY

## 2023-03-20 PROCEDURE — 99213 OFFICE O/P EST LOW 20 MIN: CPT | Mod: TH,S$PBB,, | Performed by: OBSTETRICS & GYNECOLOGY

## 2023-03-20 NOTE — PROGRESS NOTES
32w4d without major complaints.   FH beginning to lag further, will get U/S for growth.   PPC discussed and patient would like pOCPs.   RTC in 2 weeks for routine PNC with U/S    I spent a total of 20 minutes on the day of the visit. This includes face to face time and non-face to face time preparing to see the patient (eg, review of tests with interpretation of results and explanation of results to patient), obtaining and/or reviewing separately obtained history, documenting clinical information, appropriate prenatal counseling, and care coordination.

## 2023-03-21 ENCOUNTER — CLINICAL SUPPORT (OUTPATIENT)
Dept: REHABILITATION | Facility: HOSPITAL | Age: 23
End: 2023-03-21
Attending: OBSTETRICS & GYNECOLOGY
Payer: MEDICAID

## 2023-03-21 DIAGNOSIS — O99.891 BACK PAIN AFFECTING PREGNANCY, ANTEPARTUM: Primary | ICD-10-CM

## 2023-03-21 DIAGNOSIS — M54.9 BACK PAIN AFFECTING PREGNANCY, ANTEPARTUM: Primary | ICD-10-CM

## 2023-03-21 PROCEDURE — 97110 THERAPEUTIC EXERCISES: CPT | Mod: PO

## 2023-03-21 NOTE — PROGRESS NOTES
[facility]   Physical Therapy Treatment Note     Name: Rina Keene  Clinic Number: [id number]    Therapy Diagnosis:        Encounter Diagnosis   Name Primary?    Back pain affecting pregnancy, antepartum       Physician: Jacqueline Perez*  Visit Date: 2/7/2023    Physician Orders: PT Eval and Treat   Medical Diagnosis from Referral: O99.891,M54.9 (ICD-10-CM) - Back pain affecting pregnancy, antepartum  Evaluation Date: 2/3/2023  Authorization Period Expiration: 12/20/2023  Plan of Care Expiration: 4/28/2023  Progress Note Due: 04/02/2023  Visit # / Visits authorized: 1/1 eval, 10/20  FOTO: TBD/TBD  Precautions: Standard  PTA Visit #: 0/5     Time In: 5:05 pm  Time Out: 6:05 pm  Total Billable Time: 60 minutes    SUBJECTIVE     Patient reports she was sitting for a long time which gave her mild low back pain, which she was able to relieve with getting up and walking.    She was compliant with home exercise program.  Response to previous treatment: No lower back or hip pain.  Functional change: Improved sitting and walking tolerance.    Pain: 1/10  Location: Left low back (iliolumbar region)    OBJECTIVE     Objective Measures updated at progress report unless specified.    Treatment     Rina received the treatments listed below:      *Medicaid Payor: [place] Only as Therapeutic Exercise*    Therapeutic Activities to improve functional performance for 10 minutes, including:  Recumbent Bike: 10 minutes, level 4.0, to improve activity tolerance  Single Leg Deadlifts: 2x10 with 25# kettlebell (not performed)    Manual Therapy Techniques: Soft tissue mobilization and joint mobilizations were applied to the left iliolumbar ligament and lumbar paraspinals piriformis for 15 minutes:  Soft Tissue Mobilization, left iliolumbar ligament and lumbar paraspinals, strumming, 15 minutes    Neuromuscular Re-education activities to improve: Balance, Kinesthetic, Proprioception, and Posture for 35  minutes. The following activities were included:  Single Leg Captain Gunnar 1/4 Squats, 3x10, bilateral with ball against wall  Single Leg 1/4 Squat Position with Kettlebell Side to Side Passes: 2x60 seconds each leg, 5# kettlebell, 2nd set on Airex pad  Cable Column Paloff Press + Overhead Press: 2x10, 10#, bilateral  Single Leg Stance - Rebounder - Lateral - Foam Pad: 2x20 passes each lower extremity, red ball  BOSU Squats - Ball Side Down: 2x15 reps, with dual motor and cognitive tasks  Star Excursion Sliders: 2x5 rounds, bilateral  TRX Bodyweight Rows: 3x15    Patient Education and Home Exercises     Home Exercises Provided and Patient Education Provided   Education provided:   - HEP review    Written Home Exercises Provided: Instructed to continue with prior HEP. Exercises were reviewed and Rina was able to demonstrate them prior to the end of the session. Rina demonstrated good  understanding of the education provided. See EMR under Patient Instructions for exercises provided during therapy sessions    ASSESSMENT     Session focused on application of STM to decrease left sided low back pain followed by patient instruction in self STM using lacrosse or tennis ball at home, and ending with progressions in single leg stance and lumbopelvic stability. Patient with excellent tolerance of session and responded well to STM reporting no pain afterwards. Instructed patient to continue with current HEP at end of session which she verbalized understanding of. Discussed plans for reassessment and discharge to home at end of next week which she verbalized understanding of.    Rina is progressing well towards her goals.    Pt prognosis is good.    Pt will continue to benefit from skilled outpatient physical therapy to address the deficits listed in the problem list box on initial evaluation, provide pt/family education and to maximize pt's level of independence in the home and community environment.     Pt's  spiritual, cultural and educational needs considered and pt agreeable to plan of care and goals.     Anticipated barriers to physical therapy: progressing pregnancy    Goals:  Short Term Goals: 4 weeks  1.Report decreased low back pain  </= 2/10  to increase tolerance for functional mobility (Met 03/02)  2. Increase ROM by 10 degrees where limited in order to perform ADLs without difficulty. (Met 03/02)  3. Increase strength by 1/3 MMT grade in Hip ABD  to increase tolerance for ADL and work activities. (Met 03/02)  4. Pt to tolerate HEP to improve ROM and independence with ADL's (Met 03/02)     Long Term Goals: 6 weeks  1.Report decreased low back pain </= 2/10  to increase tolerance for functional mobility -Progressing  2.Patient goal: No pain when walking and standing -Progressing  3.Increase strength to 4+/5 in  hip abd  to increase tolerance for ADL and work activities. -Progressing    PLAN     Continue progressing per patient tolerance and POC.    Fred Beatty, PT, DPT

## 2023-03-23 ENCOUNTER — CLINICAL SUPPORT (OUTPATIENT)
Dept: REHABILITATION | Facility: HOSPITAL | Age: 23
End: 2023-03-23
Attending: OBSTETRICS & GYNECOLOGY
Payer: MEDICAID

## 2023-03-23 DIAGNOSIS — M54.9 BACK PAIN AFFECTING PREGNANCY, ANTEPARTUM: Primary | ICD-10-CM

## 2023-03-23 DIAGNOSIS — O99.891 BACK PAIN AFFECTING PREGNANCY, ANTEPARTUM: Primary | ICD-10-CM

## 2023-03-23 PROCEDURE — 97110 THERAPEUTIC EXERCISES: CPT | Mod: PO

## 2023-03-23 NOTE — PROGRESS NOTES
"[facility]   Physical Therapy Treatment Note     Name: Rina Hookerthisulema  Clinic Number: [id number]    Therapy Diagnosis:        Encounter Diagnosis   Name Primary?    Back pain affecting pregnancy, antepartum       Physician: Jacqueline Perez  Visit Date: 03/23/2023    Physician Orders: PT Eval and Treat  Medical Diagnosis from Referral: O99.891,M54.9 (ICD-10-CM) - Back pain affecting pregnancy, antepartum  Evaluation Date: 2/3/2023  Authorization Period Expiration: 12/20/2023  Plan of Care Expiration: 4/28/2023  Progress Note Due: 04/02/2023  Visit # / Visits authorized: 1/1 eval, 11/20  FOTO: TBD/TBD  Precautions: Standard  PTA Visit #: 0/5    Time In: 5:10 pm  Time Out: 6:10 pm  Total Billable Time: 60 minutes    SUBJECTIVE     Patient reports no symptoms since her last visit, and stating that she felt "really good" following application of manual interventions last session.    She was compliant with home exercise program.  Response to previous treatment: No lower back or hip pain.  Functional change: Minimal pain and only with prolonged sitting.    Pain: 0/10  Location: None    OBJECTIVE     Objective Measures updated at progress report unless specified.    Treatment     Rina received the treatments listed below:      *Medicaid Payor: [place] Only as Therapeutic Exercise*    Therapeutic Activities to improve functional performance for 25 minutes, including:  Recumbent Bike: 10 minutes, rolling hills setting, to improve activity tolerance  Single Leg Deadlifts: 3x10 with 25# kettlebell  Foam Rolling: 10 minutes, bilateral glutes, piriformis, and hamstrings    Manual Therapy Techniques: Soft tissue mobilization was applied to the left iliolumbar ligament and lumbar paraspinals piriformis for 20 minutes:  Soft Tissue Mobilization, left iliolumbar ligament and lumbar paraspinals, strumming, 20 minutes    Neuromuscular Re-education activities to improve: Balance, Kinesthetic, " Proprioception, and Posture for 15 minutes. The following activities were included:  Single Leg Stance + OH KB Press: 3x10 with 5# kettlebell  Single Leg Deadlift on Foam with KB Cone Taps, 2x10, bilateral    Single Leg Captain Gunnar 1/4 Squats, 3x10, bilateral with ball against wall (not performed)  Cable Column Paloff Press + Overhead Press: 2x10, 10#, bilateral (not performed)  Single Leg Stance - Rebounder - Lateral - Foam Pad: 2x20 passes each lower extremity, red ball (not performed)  BOSU Squats - Ball Side Down: 2x15 reps, with dual motor and cognitive tasks (not performed)  Star Excursion Sliders: 2x5 rounds, bilateral (not performed)    Patient Education and Home Exercises     Home Exercises Provided and Patient Education Provided   Education provided:   - HEP review    Written Home Exercises Provided: Instructed to continue with prior HEP. Exercises were reviewed and Rina was able to demonstrate them prior to the end of the session. Rina demonstrated good  understanding of the education provided. See EMR under Patient Instructions for exercises provided during therapy sessions    ASSESSMENT     Session focused on application of manual interventions to improve lumbopelvic mobility, followed by single leg stabilization exercise progressions, and ending with patient instruction in foam rolling targeting the glutes, piriformis, and hamstrings. Patient with excellent tolerance of session and demonstrates good initial grasp of foam rolling exercises. Instructed patient to continue with self progressions of HEP at home by adding in single leg stability exercises, and discussing possibility of getting her own foam roller for home use, which she verbalized understanding of.    Rina is progressing well towards her goals.  Pt prognosis is good.    Pt will continue to benefit from skilled outpatient physical therapy to address the deficits listed in the problem list box on initial evaluation, provide pt/family  education and to maximize pt's level of independence in the home and community environment.     Pt's spiritual, cultural and educational needs considered and pt agreeable to plan of care and goals.     Anticipated barriers to physical therapy: progressing pregnancy    Goals:  Short Term Goals: 4 weeks  1.Report decreased low back pain  </= 2/10  to increase tolerance for functional mobility (Met 03/02)  2. Increase ROM by 10 degrees where limited in order to perform ADLs without difficulty. (Met 03/02)  3. Increase strength by 1/3 MMT grade in Hip ABD  to increase tolerance for ADL and work activities. (Met 03/02)  4. Pt to tolerate HEP to improve ROM and independence with ADL's (Met 03/02)     Long Term Goals: 6 weeks  1.Report decreased low back pain </= 2/10  to increase tolerance for functional mobility (Met 03/23)  2.Patient goal: No pain when walking and standing -Progressing  3.Increase strength to 4+/5 in  hip abd  to increase tolerance for ADL and work activities. -Progressing    PLAN     Continue progressing per patient tolerance and POC.    Fred Beatty, PT, DPT

## 2023-03-30 ENCOUNTER — DOCUMENTATION ONLY (OUTPATIENT)
Dept: REHABILITATION | Facility: HOSPITAL | Age: 23
End: 2023-03-30

## 2023-03-30 ENCOUNTER — CLINICAL SUPPORT (OUTPATIENT)
Dept: REHABILITATION | Facility: HOSPITAL | Age: 23
End: 2023-03-30
Attending: OBSTETRICS & GYNECOLOGY
Payer: MEDICAID

## 2023-03-30 DIAGNOSIS — O99.891 BACK PAIN AFFECTING PREGNANCY, ANTEPARTUM: Primary | ICD-10-CM

## 2023-03-30 DIAGNOSIS — M54.9 BACK PAIN AFFECTING PREGNANCY, ANTEPARTUM: Primary | ICD-10-CM

## 2023-03-30 PROCEDURE — 97110 THERAPEUTIC EXERCISES: CPT | Mod: PO

## 2023-03-30 NOTE — PROGRESS NOTES
Outpatient Therapy Discharge Summary     Name: Rina Keene  Clinic Number: [id number]    Therapy Diagnosis:   Encounter Diagnosis   Name Primary?    Back pain affecting pregnancy, antepartum       Physician: Jacqueline Perez    Physician Orders: PT Eval and Treat  Medical Diagnosis: O99.891,M54.9 (ICD-10-CM) - Back pain affecting pregnancy, antepartum  Evaluation Date: 02/03/2023    Date of Last visit: 03/30/2023  Total Visits Received: 13  Cancelled Visits: 3  No Show Visits: 0    Assessment      Rina Keene is a 23 year old female referred to physical therapy due to left sided low back pain in the presence of pregnancy. The patient has completed 12 visits following her initial evaluation on 02/03/2023, and has made excellent progress, meeting all of her short and long term goals at this goals. Rina is no longer experiencing left sided lower back, has improved her gross bilateral hip strength to 5/5, and is independent with her final home exercise program. The patient has no further skilled physical therapy needs at this time, and is discharged to home with self care.    Goals:  Short Term Goals: 4 weeks  1.Report decreased low back pain  </= 2/10  to increase tolerance for functional mobility (Met 03/02)  2. Increase ROM by 10 degrees where limited in order to perform ADLs without difficulty. (Met 03/02)  3. Increase strength by 1/3 MMT grade in Hip ABD  to increase tolerance for ADL and work activities. (Met 03/02)  4. Pt to tolerate HEP to improve ROM and independence with ADL's (Met 03/02)     Long Term Goals: 6 weeks  1.Report decreased low back pain </= 2/10  to increase tolerance for functional mobility (Met 03/23)  2.Patient goal: No pain when walking and standing (Met 03/30)  3.Increase strength to 4+/5 in  hip abd  to increase tolerance for ADL and work activities. (Met 03/30)    Discharge reason: Patient has completed the physician's prescription, Patient is now asymptomatic, and  Patient has met all of his/her goals    Plan   This patient is discharged from Physical Therapy    Fred Beatty, PT, DPT

## 2023-03-30 NOTE — PROGRESS NOTES
[facility]   Physical Therapy Treatment Note     Name: Rina Hookerthisulema  Clinic Number: [id number]    Therapy Diagnosis:        Encounter Diagnosis   Name Primary?    Back pain affecting pregnancy, antepartum       Physician: Jacqueline Perez  Visit Date: 03/23/2023    Physician Orders: PT Eval and Treat  Medical Diagnosis from Referral: O99.891,M54.9 (ICD-10-CM) - Back pain affecting pregnancy, antepartum  Evaluation Date: 2/3/2023  Authorization Period Expiration: 12/20/2023  Plan of Care Expiration: 4/28/2023  Progress Note Due: 03/30/2023  Visit # / Visits authorized: 1/1 eval, 12/20  FOTO: TBD/TBD  Precautions: Standard  PTA Visit #: 0/5    Time In: 5:10 pm  Time Out: 6:05 pm  Total Billable Time: 55 minutes    SUBJECTIVE     Patient reports she continues to be symptom free, and feels ready for discharge to home with her final home exercise program.    She was compliant with home exercise program.  Response to previous treatment: No lower back or hip pain.  Functional change: No longer having symptoms, independent with final home exercise program.    Pain: 0/10  Location: None    OBJECTIVE     Objective Measures updated at progress report unless specified.    Treatment     Rina received the treatments listed below:      *Medicaid Payor: Bill Only as Therapeutic Exercise*    Therapeutic Activities to improve functional performance for 40 minutes, including:  Subjective/Objective Measurements (10 minutes)  Recumbent Bike: 10 minutes, rolling hills setting, to improve activity tolerance  Single Lao Leg Deadlifts: 3x10 with 25# kettlebell  Foam Rolling: 10 minutes, bilateral glutes, piriformis, and hamstrings  Patient Education (See Below)    Neuromuscular Re-education activities to improve: Balance, Kinesthetic, Proprioception, and Posture for 15 minutes. The following activities were included:  Single Leg Stance + OH KB Press: 3x10 with 5# kettlebell  Modified Side Plank  + Clam Shell, 3x10, bilateral  Reverse Lunge Sliders, 3x10, bilateral  Single Leg Stance with Lateral Rebounder Throw, 3x10 each leg    Patient Education and Home Exercises     Home Exercises Provided and Patient Education Provided   Education provided:   - Final HEP review    Written Home Exercises Provided: Instructed to continue with prior HEP. Exercises were reviewed and Rina was able to demonstrate them prior to the end of the session. Rina demonstrated good  understanding of the education provided. See EMR under Patient Instructions for exercises provided during therapy sessions    ASSESSMENT     Rina Keene is a 23 year old female referred to physical therapy due to left sided low back pain in the presence of pregnancy. The patient has completed 12 visits following her initial evaluation on 02/03/2023, and has made excellent progress, meeting all of her short and long term goals at this goals. Rina is no longer experiencing left sided lower back, has improved her gross bilateral hip strength to 5/5, and is independent with her final home exercise program. The patient has no further skilled physical therapy needs at this time, and is discharged to home with self care.    Goals:  Short Term Goals: 4 weeks  1.Report decreased low back pain  </= 2/10  to increase tolerance for functional mobility (Met 03/02)  2. Increase ROM by 10 degrees where limited in order to perform ADLs without difficulty. (Met 03/02)  3. Increase strength by 1/3 MMT grade in Hip ABD  to increase tolerance for ADL and work activities. (Met 03/02)  4. Pt to tolerate HEP to improve ROM and independence with ADL's (Met 03/02)     Long Term Goals: 6 weeks  1.Report decreased low back pain </= 2/10  to increase tolerance for functional mobility (Met 03/23)  2.Patient goal: No pain when walking and standing (Met 03/30)  3.Increase strength to 4+/5 in  hip abd  to increase tolerance for ADL and work activities. (Met 03/30)    PLAN      Discharge to home with self care and final home exercise program.    Fred Beatty, PT, DPT

## 2023-04-06 ENCOUNTER — PATIENT MESSAGE (OUTPATIENT)
Dept: OTHER | Facility: OTHER | Age: 23
End: 2023-04-06
Payer: MEDICAID

## 2023-04-10 ENCOUNTER — PROCEDURE VISIT (OUTPATIENT)
Dept: OBSTETRICS AND GYNECOLOGY | Facility: CLINIC | Age: 23
End: 2023-04-10
Payer: MEDICAID

## 2023-04-10 ENCOUNTER — LAB VISIT (OUTPATIENT)
Dept: LAB | Facility: HOSPITAL | Age: 23
End: 2023-04-10
Attending: OBSTETRICS & GYNECOLOGY
Payer: MEDICAID

## 2023-04-10 ENCOUNTER — PATIENT MESSAGE (OUTPATIENT)
Dept: OBSTETRICS AND GYNECOLOGY | Facility: CLINIC | Age: 23
End: 2023-04-10
Payer: MEDICAID

## 2023-04-10 ENCOUNTER — ROUTINE PRENATAL (OUTPATIENT)
Dept: OBSTETRICS AND GYNECOLOGY | Facility: CLINIC | Age: 23
End: 2023-04-10
Payer: MEDICAID

## 2023-04-10 VITALS
DIASTOLIC BLOOD PRESSURE: 64 MMHG | BODY MASS INDEX: 25.98 KG/M2 | SYSTOLIC BLOOD PRESSURE: 112 MMHG | WEIGHT: 133.06 LBS

## 2023-04-10 DIAGNOSIS — Z34.93 NORMAL PREGNANCY IN THIRD TRIMESTER: Primary | ICD-10-CM

## 2023-04-10 DIAGNOSIS — Z3A.36 36 WEEKS GESTATION OF PREGNANCY: ICD-10-CM

## 2023-04-10 DIAGNOSIS — O26.849 SIGNIFICANT DISCREPANCY BETWEEN UTERINE SIZE AND CLINICAL DATES, ANTEPARTUM: ICD-10-CM

## 2023-04-10 LAB
BASOPHILS # BLD AUTO: 0.01 K/UL (ref 0–0.2)
BASOPHILS NFR BLD: 0.1 % (ref 0–1.9)
DIFFERENTIAL METHOD: ABNORMAL
EOSINOPHIL # BLD AUTO: 0 K/UL (ref 0–0.5)
EOSINOPHIL NFR BLD: 0.3 % (ref 0–8)
ERYTHROCYTE [DISTWIDTH] IN BLOOD BY AUTOMATED COUNT: 12.6 % (ref 11.5–14.5)
HCT VFR BLD AUTO: 32.7 % (ref 37–48.5)
HGB BLD-MCNC: 11.2 G/DL (ref 12–16)
HIV 1+2 AB+HIV1 P24 AG SERPL QL IA: NORMAL
IMM GRANULOCYTES # BLD AUTO: 0.02 K/UL (ref 0–0.04)
IMM GRANULOCYTES NFR BLD AUTO: 0.3 % (ref 0–0.5)
LYMPHOCYTES # BLD AUTO: 1.6 K/UL (ref 1–4.8)
LYMPHOCYTES NFR BLD: 22.2 % (ref 18–48)
MCH RBC QN AUTO: 31.7 PG (ref 27–31)
MCHC RBC AUTO-ENTMCNC: 34.3 G/DL (ref 32–36)
MCV RBC AUTO: 93 FL (ref 82–98)
MONOCYTES # BLD AUTO: 0.4 K/UL (ref 0.3–1)
MONOCYTES NFR BLD: 6 % (ref 4–15)
NEUTROPHILS # BLD AUTO: 5 K/UL (ref 1.8–7.7)
NEUTROPHILS NFR BLD: 71.1 % (ref 38–73)
NRBC BLD-RTO: 0 /100 WBC
PLATELET # BLD AUTO: 247 K/UL (ref 150–450)
PMV BLD AUTO: 10.5 FL (ref 9.2–12.9)
RBC # BLD AUTO: 3.53 M/UL (ref 4–5.4)
WBC # BLD AUTO: 6.98 K/UL (ref 3.9–12.7)

## 2023-04-10 PROCEDURE — 99999 PR PBB SHADOW E&M-EST. PATIENT-LVL II: ICD-10-PCS | Mod: PBBFAC,,, | Performed by: OBSTETRICS & GYNECOLOGY

## 2023-04-10 PROCEDURE — 76816 OB US FOLLOW-UP PER FETUS: CPT | Mod: PBBFAC,PN | Performed by: OBSTETRICS & GYNECOLOGY

## 2023-04-10 PROCEDURE — 76816 US OB/GYN EXTENDED PROCEDURE (VIEWPOINT): ICD-10-PCS | Mod: 26,S$PBB,, | Performed by: OBSTETRICS & GYNECOLOGY

## 2023-04-10 PROCEDURE — 87081 CULTURE SCREEN ONLY: CPT | Performed by: OBSTETRICS & GYNECOLOGY

## 2023-04-10 PROCEDURE — 99213 OFFICE O/P EST LOW 20 MIN: CPT | Mod: TH,S$PBB,, | Performed by: OBSTETRICS & GYNECOLOGY

## 2023-04-10 PROCEDURE — 99213 PR OFFICE/OUTPT VISIT, EST, LEVL III, 20-29 MIN: ICD-10-PCS | Mod: TH,S$PBB,, | Performed by: OBSTETRICS & GYNECOLOGY

## 2023-04-10 PROCEDURE — 85025 COMPLETE CBC W/AUTO DIFF WBC: CPT | Performed by: OBSTETRICS & GYNECOLOGY

## 2023-04-10 PROCEDURE — 86592 SYPHILIS TEST NON-TREP QUAL: CPT | Performed by: OBSTETRICS & GYNECOLOGY

## 2023-04-10 PROCEDURE — 99212 OFFICE O/P EST SF 10 MIN: CPT | Mod: PBBFAC,PN | Performed by: OBSTETRICS & GYNECOLOGY

## 2023-04-10 PROCEDURE — 87389 HIV-1 AG W/HIV-1&-2 AB AG IA: CPT | Performed by: OBSTETRICS & GYNECOLOGY

## 2023-04-10 PROCEDURE — 99999 PR PBB SHADOW E&M-EST. PATIENT-LVL II: CPT | Mod: PBBFAC,,, | Performed by: OBSTETRICS & GYNECOLOGY

## 2023-04-10 NOTE — PROGRESS NOTES
35w4d without major complaints.   U/S shows EFW 22%ile.   R/B/A of vaginal, operative, and  delivery as well as transfusion of blood products discussed today.R/B/A of  circumcision also reviewed.  All questions answered and patient voices understanding. Consents signed.   GBS, HIV, RPR, CBC collected.   Labor precautions discussed.   RTC in 2 weeks for routine PNC.

## 2023-04-11 LAB — RPR SER QL: NORMAL

## 2023-04-13 LAB — BACTERIA SPEC AEROBE CULT: NORMAL

## 2023-04-28 ENCOUNTER — TELEPHONE (OUTPATIENT)
Dept: OBSTETRICS AND GYNECOLOGY | Facility: CLINIC | Age: 23
End: 2023-04-28

## 2023-04-28 ENCOUNTER — ROUTINE PRENATAL (OUTPATIENT)
Dept: OBSTETRICS AND GYNECOLOGY | Facility: CLINIC | Age: 23
End: 2023-04-28
Payer: MEDICAID

## 2023-04-28 VITALS — WEIGHT: 138.75 LBS | DIASTOLIC BLOOD PRESSURE: 72 MMHG | SYSTOLIC BLOOD PRESSURE: 118 MMHG | BODY MASS INDEX: 27.1 KG/M2

## 2023-04-28 DIAGNOSIS — Z34.93 NORMAL PREGNANCY IN THIRD TRIMESTER: Primary | ICD-10-CM

## 2023-04-28 DIAGNOSIS — Z34.90 ENCOUNTER FOR ELECTIVE INDUCTION OF LABOR: Primary | ICD-10-CM

## 2023-04-28 DIAGNOSIS — Z3A.38 38 WEEKS GESTATION OF PREGNANCY: ICD-10-CM

## 2023-04-28 PROCEDURE — 99999 PR PBB SHADOW E&M-EST. PATIENT-LVL II: ICD-10-PCS | Mod: PBBFAC,,, | Performed by: OBSTETRICS & GYNECOLOGY

## 2023-04-28 PROCEDURE — 99212 OFFICE O/P EST SF 10 MIN: CPT | Mod: PBBFAC,TH | Performed by: OBSTETRICS & GYNECOLOGY

## 2023-04-28 PROCEDURE — 99999 PR PBB SHADOW E&M-EST. PATIENT-LVL II: CPT | Mod: PBBFAC,,, | Performed by: OBSTETRICS & GYNECOLOGY

## 2023-04-28 PROCEDURE — 99213 OFFICE O/P EST LOW 20 MIN: CPT | Mod: TH,S$PBB,, | Performed by: OBSTETRICS & GYNECOLOGY

## 2023-04-28 PROCEDURE — 99213 PR OFFICE/OUTPT VISIT, EST, LEVL III, 20-29 MIN: ICD-10-PCS | Mod: TH,S$PBB,, | Performed by: OBSTETRICS & GYNECOLOGY

## 2023-04-28 NOTE — PROGRESS NOTES
38w1d without major complaints.   Labor precautions reviewed.   IOL discussed and patient would like IOL as soon as is safe.   RTC in 1 week for routine PNC.     I spent a total of 20 minutes on the day of the visit. This includes face to face time and non-face to face time preparing to see the patient (eg, review of tests with interpretation of results and explanation of results to patient), obtaining and/or reviewing separately obtained history, documenting clinical information, appropriate prenatal counseling, and care coordination.

## 2023-04-28 NOTE — TELEPHONE ENCOUNTER
Induction requested for 5/5 at 1700.    I also scheduled her for NST at [facility] on 5/5 at 1:00.  And Fredi can schedule the 1130 appt.

## 2023-04-28 NOTE — TELEPHONE ENCOUNTER
----- Message from Jacqueline Perez MD sent at 2023  3:07 PM CDT -----  Hey Team,   Can we please request an IOL for Rina on  at 5 pm. She will be 39w1d. .   Can we also please put her on my schedule for 11:30 for a CRB placement and then to follow at  testing for an NST?  Thank you!!!

## 2023-04-29 ENCOUNTER — PATIENT MESSAGE (OUTPATIENT)
Dept: OBSTETRICS AND GYNECOLOGY | Facility: CLINIC | Age: 23
End: 2023-04-29
Payer: MEDICAID

## 2023-04-29 ENCOUNTER — OFFICE VISIT (OUTPATIENT)
Dept: URGENT CARE | Facility: CLINIC | Age: 23
End: 2023-04-29
Payer: MEDICAID

## 2023-04-29 ENCOUNTER — NURSE TRIAGE (OUTPATIENT)
Dept: ADMINISTRATIVE | Facility: CLINIC | Age: 23
End: 2023-04-29
Payer: MEDICAID

## 2023-04-29 VITALS
RESPIRATION RATE: 16 BRPM | HEART RATE: 97 BPM | TEMPERATURE: 98 F | OXYGEN SATURATION: 98 % | SYSTOLIC BLOOD PRESSURE: 112 MMHG | DIASTOLIC BLOOD PRESSURE: 71 MMHG | HEIGHT: 60 IN | BODY MASS INDEX: 27.09 KG/M2 | WEIGHT: 138 LBS

## 2023-04-29 DIAGNOSIS — J02.9 SORE THROAT: ICD-10-CM

## 2023-04-29 DIAGNOSIS — J30.9 ALLERGIC RHINITIS, UNSPECIFIED SEASONALITY, UNSPECIFIED TRIGGER: Primary | ICD-10-CM

## 2023-04-29 DIAGNOSIS — R09.81 SINUS CONGESTION: ICD-10-CM

## 2023-04-29 DIAGNOSIS — Z3A.38 38 WEEKS GESTATION OF PREGNANCY: ICD-10-CM

## 2023-04-29 LAB
CTP QC/QA: YES
SARS-COV-2 AG RESP QL IA.RAPID: NEGATIVE

## 2023-04-29 PROCEDURE — 99213 PR OFFICE/OUTPT VISIT, EST, LEVL III, 20-29 MIN: ICD-10-PCS | Mod: S$GLB,,, | Performed by: NURSE PRACTITIONER

## 2023-04-29 PROCEDURE — 87811 SARS-COV-2 COVID19 W/OPTIC: CPT | Mod: QW,S$GLB,, | Performed by: NURSE PRACTITIONER

## 2023-04-29 PROCEDURE — 87811 SARS CORONAVIRUS 2 ANTIGEN POCT, MANUAL READ: ICD-10-PCS | Mod: QW,S$GLB,, | Performed by: NURSE PRACTITIONER

## 2023-04-29 PROCEDURE — 99213 OFFICE O/P EST LOW 20 MIN: CPT | Mod: S$GLB,,, | Performed by: NURSE PRACTITIONER

## 2023-04-29 NOTE — TELEPHONE ENCOUNTER
Pt 38 weeks pregnant.   Seen in UC earlier for sore throat. CV-. Was advised to f/u with clinic for safe OTC meds for sore throat/allergies. Pt confirms symptoms unchanged since eval earlier, just wanting info on safe meds.     All questions answered. No further assistance needed at this time.     Reason for Disposition   Caller requesting information about medicine during pregnancy; adult is not ill AND triager answers question    Protocols used: Medication Question Call-A-AH

## 2023-04-29 NOTE — PATIENT INSTRUCTIONS
Please refer to list of medications given to you by your OBGYN for products to help with sinus congestion and postnasal drip.    You may drink hot tea to see your throat intake Tylenol as needed for throat discomfort.

## 2023-04-29 NOTE — PROGRESS NOTES
Subjective:      Patient ID: Rina Keene is a 23 y.o. female.    Vitals:  height is 5' (1.524 m) and weight is 62.6 kg (138 lb). Her oral temperature is 98.2 °F (36.8 °C). Her blood pressure is 112/71 and her pulse is 97. Her respiration is 16 and oxygen saturation is 98%.     Chief Complaint: Sore Throat    Patient presents today with sore throat that began last night.  Patient also complains of coughing.  Cough was productive of green mucus this morning.  Patient has not taken any medications for symptoms.  Patient is currently 38 weeks pregnant.     Patient denies fever, body aches or chills.  Has significant history of allergic rhinitis and presumed that symptoms were related to that.  She has not taken any medication for allergic rhinitis.  No sick contacts.    Sore Throat   This is a new problem. The current episode started today. Associated symptoms include coughing. She has tried nothing for the symptoms.     HENT:  Positive for sore throat.    Respiratory:  Positive for cough.     Objective:     Physical Exam   Constitutional: She is oriented to person, place, and time. She appears well-developed. She is cooperative.  Non-toxic appearance. She does not appear ill. No distress.   HENT:   Head: Normocephalic and atraumatic.   Ears:   Right Ear: Hearing, tympanic membrane, external ear and ear canal normal.   Left Ear: Hearing, tympanic membrane, external ear and ear canal normal.   Nose: Rhinorrhea present. No mucosal edema or nasal deformity. No epistaxis. Right sinus exhibits no maxillary sinus tenderness and no frontal sinus tenderness. Left sinus exhibits no maxillary sinus tenderness and no frontal sinus tenderness.   Mouth/Throat: Uvula is midline, oropharynx is clear and moist and mucous membranes are normal. No trismus in the jaw. Normal dentition. No uvula swelling. Cobblestoning present. No oropharyngeal exudate, posterior oropharyngeal edema or posterior oropharyngeal erythema.   Eyes:  Conjunctivae and lids are normal. No scleral icterus.   Neck: Trachea normal and phonation normal. Neck supple. No edema present. No erythema present. No neck rigidity present.   Cardiovascular: Normal rate, regular rhythm, normal heart sounds and normal pulses.   Pulmonary/Chest: Effort normal and breath sounds normal. No stridor. No respiratory distress. She has no decreased breath sounds. She has no wheezes. She has no rhonchi. She has no rales.   Abdominal: Normal appearance. She exhibits distension.      Comments: Pregnant   Musculoskeletal: Normal range of motion.         General: No deformity. Normal range of motion.   Neurological: She is alert and oriented to person, place, and time. She exhibits normal muscle tone. Coordination normal.   Skin: Skin is warm, dry, intact, not diaphoretic and not pale.   Psychiatric: Her speech is normal and behavior is normal. Judgment and thought content normal.   Nursing note and vitals reviewed.    Results for orders placed or performed in visit on 04/29/23   SARS Coronavirus 2 Antigen, POCT Manual Read   Result Value Ref Range    SARS Coronavirus 2 Antigen Negative Negative     Acceptable Yes        Assessment:     1. Allergic rhinitis, unspecified seasonality, unspecified trigger    2. Sore throat    3. 38 weeks gestation of pregnancy    4. Sinus congestion        Plan:     Labs ordered at this visit reviewed.     Conservative treatment pregnant patient with suggestion for oral antihistamine for presumptive allergic rhinitis.  Patient to be induced for labor within the next week.  I advised she discuss her current symptoms with her OBGYN prior to induction.    Allergic rhinitis, unspecified seasonality, unspecified trigger    Sore throat  -     SARS Coronavirus 2 Antigen, POCT Manual Read    38 weeks gestation of pregnancy    Sinus congestion  -     SARS Coronavirus 2 Antigen, POCT Manual Read

## 2023-05-01 ENCOUNTER — TELEPHONE (OUTPATIENT)
Dept: OBSTETRICS AND GYNECOLOGY | Facility: CLINIC | Age: 23
End: 2023-05-01
Payer: MEDICAID

## 2023-05-05 ENCOUNTER — ANESTHESIA (OUTPATIENT)
Dept: OBSTETRICS AND GYNECOLOGY | Facility: OTHER | Age: 23
End: 2023-05-05
Payer: MEDICAID

## 2023-05-05 ENCOUNTER — ANESTHESIA EVENT (OUTPATIENT)
Dept: OBSTETRICS AND GYNECOLOGY | Facility: OTHER | Age: 23
End: 2023-05-05
Payer: MEDICAID

## 2023-05-05 ENCOUNTER — HOSPITAL ENCOUNTER (INPATIENT)
Facility: OTHER | Age: 23
LOS: 2 days | Discharge: HOME | End: 2023-05-07
Attending: OBSTETRICS & GYNECOLOGY | Admitting: OBSTETRICS & GYNECOLOGY
Payer: MEDICAID

## 2023-05-05 DIAGNOSIS — Z3A.39 39 WEEKS GESTATION OF PREGNANCY: ICD-10-CM

## 2023-05-05 DIAGNOSIS — O42.92 FULL-TERM PREMATURE RUPTURE OF MEMBRANES, UNSPECIFIED DURATION TO ONSET OF LABOR: Primary | ICD-10-CM

## 2023-05-05 DIAGNOSIS — O42.92 FULL-TERM PREMATURE RUPTURE OF MEMBRANES: ICD-10-CM

## 2023-05-05 LAB
ABO + RH BLD: NORMAL
BASOPHILS # BLD AUTO: 0.02 K/UL (ref 0–0.2)
BASOPHILS NFR BLD: 0.2 % (ref 0–1.9)
BLD GP AB SCN CELLS X3 SERPL QL: NORMAL
DIFFERENTIAL METHOD: ABNORMAL
EOSINOPHIL # BLD AUTO: 0 K/UL (ref 0–0.5)
EOSINOPHIL NFR BLD: 0.2 % (ref 0–8)
ERYTHROCYTE [DISTWIDTH] IN BLOOD BY AUTOMATED COUNT: 12.8 % (ref 11.5–14.5)
HCT VFR BLD AUTO: 33.7 % (ref 37–48.5)
HGB BLD-MCNC: 11.5 G/DL (ref 12–16)
IMM GRANULOCYTES # BLD AUTO: 0.02 K/UL (ref 0–0.04)
IMM GRANULOCYTES NFR BLD AUTO: 0.2 % (ref 0–0.5)
LYMPHOCYTES # BLD AUTO: 1.7 K/UL (ref 1–4.8)
LYMPHOCYTES NFR BLD: 17.4 % (ref 18–48)
MCH RBC QN AUTO: 31.6 PG (ref 27–31)
MCHC RBC AUTO-ENTMCNC: 34.1 G/DL (ref 32–36)
MCV RBC AUTO: 93 FL (ref 82–98)
MONOCYTES # BLD AUTO: 0.6 K/UL (ref 0.3–1)
MONOCYTES NFR BLD: 5.8 % (ref 4–15)
NEUTROPHILS # BLD AUTO: 7.4 K/UL (ref 1.8–7.7)
NEUTROPHILS NFR BLD: 76.2 % (ref 38–73)
NRBC BLD-RTO: 0 /100 WBC
PLATELET # BLD AUTO: 254 K/UL (ref 150–450)
PMV BLD AUTO: 10.7 FL (ref 9.2–12.9)
RBC # BLD AUTO: 3.64 M/UL (ref 4–5.4)
SPECIMEN OUTDATE: NORMAL
WBC # BLD AUTO: 9.69 K/UL (ref 3.9–12.7)

## 2023-05-05 PROCEDURE — 86900 BLOOD TYPING SEROLOGIC ABO: CPT | Performed by: GENERAL PRACTICE

## 2023-05-05 PROCEDURE — 63600175 PHARM REV CODE 636 W HCPCS: Performed by: STUDENT IN AN ORGANIZED HEALTH CARE EDUCATION/TRAINING PROGRAM

## 2023-05-05 PROCEDURE — 63600175 PHARM REV CODE 636 W HCPCS

## 2023-05-05 PROCEDURE — 59409 OBSTETRICAL CARE: CPT | Mod: GB,,, | Performed by: OBSTETRICS & GYNECOLOGY

## 2023-05-05 PROCEDURE — C1751 CATH, INF, PER/CENT/MIDLINE: HCPCS | Performed by: ANESTHESIOLOGY

## 2023-05-05 PROCEDURE — 27200710 HC EPIDURAL INFUSION PUMP SET: Performed by: ANESTHESIOLOGY

## 2023-05-05 PROCEDURE — 59025 FETAL NON-STRESS TEST: CPT

## 2023-05-05 PROCEDURE — 11000001 HC ACUTE MED/SURG PRIVATE ROOM

## 2023-05-05 PROCEDURE — 62326 NJX INTERLAMINAR LMBR/SAC: CPT | Performed by: STUDENT IN AN ORGANIZED HEALTH CARE EDUCATION/TRAINING PROGRAM

## 2023-05-05 PROCEDURE — 85025 COMPLETE CBC W/AUTO DIFF WBC: CPT | Performed by: GENERAL PRACTICE

## 2023-05-05 PROCEDURE — 25000003 PHARM REV CODE 250: Performed by: STUDENT IN AN ORGANIZED HEALTH CARE EDUCATION/TRAINING PROGRAM

## 2023-05-05 PROCEDURE — 99283 PR EMERGENCY DEPT VISIT,LEVEL III: ICD-10-PCS | Mod: 25,,, | Performed by: OBSTETRICS & GYNECOLOGY

## 2023-05-05 PROCEDURE — 99285 EMERGENCY DEPT VISIT HI MDM: CPT | Mod: 25

## 2023-05-05 PROCEDURE — 59025 PR FETAL 2N-STRESS TEST: ICD-10-PCS | Mod: 26,,, | Performed by: OBSTETRICS & GYNECOLOGY

## 2023-05-05 PROCEDURE — 72100002 HC LABOR CARE, 1ST 8 HOURS

## 2023-05-05 PROCEDURE — 72200006 HC VAGINAL DELIVERY LEVEL III

## 2023-05-05 PROCEDURE — 59409 PRA ETRICAL CARE,VAG DELIV ONLY: ICD-10-PCS | Mod: AA,,, | Performed by: ANESTHESIOLOGY

## 2023-05-05 PROCEDURE — 99283 EMERGENCY DEPT VISIT LOW MDM: CPT | Mod: 25,,, | Performed by: OBSTETRICS & GYNECOLOGY

## 2023-05-05 PROCEDURE — 59409 PR OBSTETRICAL CARE,VAG DELIV ONLY: ICD-10-PCS | Mod: GB,,, | Performed by: OBSTETRICS & GYNECOLOGY

## 2023-05-05 PROCEDURE — 59025 FETAL NON-STRESS TEST: CPT | Mod: 26,,, | Performed by: OBSTETRICS & GYNECOLOGY

## 2023-05-05 PROCEDURE — 63600175 PHARM REV CODE 636 W HCPCS: Performed by: ANESTHESIOLOGY

## 2023-05-05 PROCEDURE — 25000003 PHARM REV CODE 250: Performed by: OBSTETRICS & GYNECOLOGY

## 2023-05-05 PROCEDURE — 59409 OBSTETRICAL CARE: CPT | Mod: AA,,, | Performed by: ANESTHESIOLOGY

## 2023-05-05 RX ORDER — MISOPROSTOL 200 UG/1
800 TABLET ORAL ONCE AS NEEDED
Status: DISCONTINUED | OUTPATIENT
Start: 2023-05-05 | End: 2023-05-07 | Stop reason: HOSPADM

## 2023-05-05 RX ORDER — METHYLERGONOVINE MALEATE 0.2 MG/ML
200 INJECTION INTRAVENOUS
Status: DISCONTINUED | OUTPATIENT
Start: 2023-05-05 | End: 2023-05-07 | Stop reason: HOSPADM

## 2023-05-05 RX ORDER — OXYTOCIN/RINGER'S LACTATE 30/500 ML
95 PLASTIC BAG, INJECTION (ML) INTRAVENOUS ONCE
Status: DISCONTINUED | OUTPATIENT
Start: 2023-05-05 | End: 2023-05-07 | Stop reason: HOSPADM

## 2023-05-05 RX ORDER — SODIUM CHLORIDE 0.9 % (FLUSH) 0.9 %
10 SYRINGE (ML) INJECTION EVERY 8 HOURS PRN
Status: DISCONTINUED | OUTPATIENT
Start: 2023-05-05 | End: 2023-05-07 | Stop reason: HOSPADM

## 2023-05-05 RX ORDER — OXYTOCIN 10 [USP'U]/ML
10 INJECTION, SOLUTION INTRAMUSCULAR; INTRAVENOUS ONCE AS NEEDED
Status: DISCONTINUED | OUTPATIENT
Start: 2023-05-05 | End: 2023-05-07 | Stop reason: HOSPADM

## 2023-05-05 RX ORDER — TRANEXAMIC ACID 10 MG/ML
1000 INJECTION, SOLUTION INTRAVENOUS ONCE AS NEEDED
Status: DISCONTINUED | OUTPATIENT
Start: 2023-05-05 | End: 2023-05-07 | Stop reason: HOSPADM

## 2023-05-05 RX ORDER — IBUPROFEN 600 MG/1
600 TABLET ORAL EVERY 6 HOURS PRN
Status: DISCONTINUED | OUTPATIENT
Start: 2023-05-05 | End: 2023-05-07 | Stop reason: HOSPADM

## 2023-05-05 RX ORDER — CARBOPROST TROMETHAMINE 250 UG/ML
250 INJECTION, SOLUTION INTRAMUSCULAR
Status: DISCONTINUED | OUTPATIENT
Start: 2023-05-05 | End: 2023-05-07 | Stop reason: HOSPADM

## 2023-05-05 RX ORDER — PROCHLORPERAZINE EDISYLATE 5 MG/ML
5 INJECTION INTRAMUSCULAR; INTRAVENOUS EVERY 6 HOURS PRN
Status: DISCONTINUED | OUTPATIENT
Start: 2023-05-05 | End: 2023-05-07 | Stop reason: HOSPADM

## 2023-05-05 RX ORDER — SODIUM CHLORIDE, SODIUM LACTATE, POTASSIUM CHLORIDE, CALCIUM CHLORIDE 600; 310; 30; 20 MG/100ML; MG/100ML; MG/100ML; MG/100ML
INJECTION, SOLUTION INTRAVENOUS CONTINUOUS
Status: DISCONTINUED | OUTPATIENT
Start: 2023-05-05 | End: 2023-05-07 | Stop reason: HOSPADM

## 2023-05-05 RX ORDER — DIPHENHYDRAMINE HYDROCHLORIDE 50 MG/ML
25 INJECTION INTRAMUSCULAR; INTRAVENOUS EVERY 4 HOURS PRN
Status: DISCONTINUED | OUTPATIENT
Start: 2023-05-05 | End: 2023-05-07 | Stop reason: HOSPADM

## 2023-05-05 RX ORDER — FENTANYL/BUPIVACAINE/NS/PF 2MCG/ML-.1
PLASTIC BAG, INJECTION (ML) INJECTION CONTINUOUS PRN
Status: DISCONTINUED | OUTPATIENT
Start: 2023-05-05 | End: 2023-05-05

## 2023-05-05 RX ORDER — OXYTOCIN/RINGER'S LACTATE 30/500 ML
334 PLASTIC BAG, INJECTION (ML) INTRAVENOUS ONCE
Status: DISCONTINUED | OUTPATIENT
Start: 2023-05-05 | End: 2023-05-07 | Stop reason: HOSPADM

## 2023-05-05 RX ORDER — LIDOCAINE HYDROCHLORIDE 10 MG/ML
10 INJECTION INFILTRATION; PERINEURAL ONCE AS NEEDED
Status: DISCONTINUED | OUTPATIENT
Start: 2023-05-05 | End: 2023-05-07 | Stop reason: HOSPADM

## 2023-05-05 RX ORDER — PRENATAL WITH FERROUS FUM AND FOLIC ACID 3080; 920; 120; 400; 22; 1.84; 3; 20; 10; 1; 12; 200; 27; 25; 2 [IU]/1; [IU]/1; MG/1; [IU]/1; MG/1; MG/1; MG/1; MG/1; MG/1; MG/1; UG/1; MG/1; MG/1; MG/1; MG/1
1 TABLET ORAL DAILY
Status: DISCONTINUED | OUTPATIENT
Start: 2023-05-06 | End: 2023-05-07 | Stop reason: HOSPADM

## 2023-05-05 RX ORDER — ONDANSETRON 8 MG/1
8 TABLET, ORALLY DISINTEGRATING ORAL EVERY 8 HOURS PRN
Status: DISCONTINUED | OUTPATIENT
Start: 2023-05-05 | End: 2023-05-07 | Stop reason: HOSPADM

## 2023-05-05 RX ORDER — CHLOROPROCAINE HYDROCHLORIDE 30 MG/ML
INJECTION, SOLUTION EPIDURAL; INFILTRATION; INTRACAUDAL; PERINEURAL
Status: DISPENSED
Start: 2023-05-05 | End: 2023-05-05

## 2023-05-05 RX ORDER — ACETAMINOPHEN 325 MG/1
650 TABLET ORAL EVERY 6 HOURS PRN
Status: DISCONTINUED | OUTPATIENT
Start: 2023-05-05 | End: 2023-05-07 | Stop reason: HOSPADM

## 2023-05-05 RX ORDER — DOCUSATE SODIUM 100 MG/1
200 CAPSULE, LIQUID FILLED ORAL 2 TIMES DAILY PRN
Status: DISCONTINUED | OUTPATIENT
Start: 2023-05-05 | End: 2023-05-07 | Stop reason: HOSPADM

## 2023-05-05 RX ORDER — BUPIVACAINE HYDROCHLORIDE 2.5 MG/ML
INJECTION, SOLUTION INFILTRATION; PERINEURAL
Status: DISCONTINUED | OUTPATIENT
Start: 2023-05-05 | End: 2023-05-05

## 2023-05-05 RX ORDER — CALCIUM CARBONATE 200(500)MG
500 TABLET,CHEWABLE ORAL 3 TIMES DAILY PRN
Status: DISCONTINUED | OUTPATIENT
Start: 2023-05-05 | End: 2023-05-07 | Stop reason: HOSPADM

## 2023-05-05 RX ORDER — FAMOTIDINE 10 MG/ML
20 INJECTION INTRAVENOUS ONCE
Status: DISCONTINUED | OUTPATIENT
Start: 2023-05-05 | End: 2023-05-07 | Stop reason: HOSPADM

## 2023-05-05 RX ORDER — PHENYLEPHRINE HYDROCHLORIDE 10 MG/ML
INJECTION INTRAVENOUS
Status: DISCONTINUED | OUTPATIENT
Start: 2023-05-05 | End: 2023-05-05

## 2023-05-05 RX ORDER — MISOPROSTOL 200 UG/1
800 TABLET ORAL
Status: DISCONTINUED | OUTPATIENT
Start: 2023-05-05 | End: 2023-05-07 | Stop reason: HOSPADM

## 2023-05-05 RX ORDER — TRANEXAMIC ACID 10 MG/ML
1000 INJECTION, SOLUTION INTRAVENOUS ONCE AS NEEDED
Status: DISCONTINUED | OUTPATIENT
Start: 2023-05-05 | End: 2023-05-05

## 2023-05-05 RX ORDER — DIPHENOXYLATE HYDROCHLORIDE AND ATROPINE SULFATE 2.5; .025 MG/1; MG/1
1 TABLET ORAL 4 TIMES DAILY PRN
Status: DISCONTINUED | OUTPATIENT
Start: 2023-05-05 | End: 2023-05-07 | Stop reason: HOSPADM

## 2023-05-05 RX ORDER — SIMETHICONE 80 MG
1 TABLET,CHEWABLE ORAL 4 TIMES DAILY PRN
Status: DISCONTINUED | OUTPATIENT
Start: 2023-05-05 | End: 2023-05-07 | Stop reason: HOSPADM

## 2023-05-05 RX ORDER — HYDROCORTISONE 25 MG/G
CREAM TOPICAL 3 TIMES DAILY PRN
Status: DISCONTINUED | OUTPATIENT
Start: 2023-05-05 | End: 2023-05-07 | Stop reason: HOSPADM

## 2023-05-05 RX ORDER — SODIUM CHLORIDE 9 MG/ML
INJECTION, SOLUTION INTRAVENOUS
Status: DISCONTINUED | OUTPATIENT
Start: 2023-05-05 | End: 2023-05-07 | Stop reason: HOSPADM

## 2023-05-05 RX ORDER — FENTANYL CITRATE 50 UG/ML
INJECTION, SOLUTION INTRAMUSCULAR; INTRAVENOUS
Status: DISCONTINUED | OUTPATIENT
Start: 2023-05-05 | End: 2023-05-05

## 2023-05-05 RX ORDER — DIPHENHYDRAMINE HCL 25 MG
25 CAPSULE ORAL EVERY 4 HOURS PRN
Status: DISCONTINUED | OUTPATIENT
Start: 2023-05-05 | End: 2023-05-07 | Stop reason: HOSPADM

## 2023-05-05 RX ORDER — TERBUTALINE SULFATE 1 MG/ML
INJECTION SUBCUTANEOUS
Status: COMPLETED
Start: 2023-05-05 | End: 2023-05-05

## 2023-05-05 RX ORDER — SODIUM CITRATE AND CITRIC ACID MONOHYDRATE 334; 500 MG/5ML; MG/5ML
30 SOLUTION ORAL ONCE
Status: DISCONTINUED | OUTPATIENT
Start: 2023-05-05 | End: 2023-05-07 | Stop reason: HOSPADM

## 2023-05-05 RX ORDER — OXYTOCIN/RINGER'S LACTATE 30/500 ML
95 PLASTIC BAG, INJECTION (ML) INTRAVENOUS ONCE AS NEEDED
Status: DISCONTINUED | OUTPATIENT
Start: 2023-05-05 | End: 2023-05-07 | Stop reason: HOSPADM

## 2023-05-05 RX ORDER — HYDROCODONE BITARTRATE AND ACETAMINOPHEN 5; 325 MG/1; MG/1
1 TABLET ORAL EVERY 4 HOURS PRN
Status: DISCONTINUED | OUTPATIENT
Start: 2023-05-05 | End: 2023-05-07 | Stop reason: HOSPADM

## 2023-05-05 RX ORDER — FENTANYL/BUPIVACAINE/NS/PF 2MCG/ML-.1
PLASTIC BAG, INJECTION (ML) INJECTION CONTINUOUS
Status: DISCONTINUED | OUTPATIENT
Start: 2023-05-05 | End: 2023-05-07 | Stop reason: HOSPADM

## 2023-05-05 RX ORDER — HYDROCODONE BITARTRATE AND ACETAMINOPHEN 10; 325 MG/1; MG/1
1 TABLET ORAL EVERY 4 HOURS PRN
Status: DISCONTINUED | OUTPATIENT
Start: 2023-05-05 | End: 2023-05-07 | Stop reason: HOSPADM

## 2023-05-05 RX ORDER — LIDOCAINE HYDROCHLORIDE AND EPINEPHRINE 15; 5 MG/ML; UG/ML
INJECTION, SOLUTION EPIDURAL
Status: DISCONTINUED | OUTPATIENT
Start: 2023-05-05 | End: 2023-05-05

## 2023-05-05 RX ORDER — OXYTOCIN/RINGER'S LACTATE 30/500 ML
334 PLASTIC BAG, INJECTION (ML) INTRAVENOUS ONCE AS NEEDED
Status: COMPLETED | OUTPATIENT
Start: 2023-05-05 | End: 2023-05-05

## 2023-05-05 RX ADMIN — TERBUTALINE SULFATE 1 MG: 1 INJECTION, SOLUTION SUBCUTANEOUS at 10:05

## 2023-05-05 RX ADMIN — PHENYLEPHRINE HYDROCHLORIDE 200 MCG: 10 INJECTION INTRAVENOUS at 09:05

## 2023-05-05 RX ADMIN — FENTANYL CITRATE 10 MCG: 0.05 INJECTION, SOLUTION INTRAMUSCULAR; INTRAVENOUS at 09:05

## 2023-05-05 RX ADMIN — PHENYLEPHRINE HYDROCHLORIDE 200 MCG: 10 INJECTION INTRAVENOUS at 10:05

## 2023-05-05 RX ADMIN — IBUPROFEN 600 MG: 600 TABLET, FILM COATED ORAL at 05:05

## 2023-05-05 RX ADMIN — IBUPROFEN 600 MG: 600 TABLET, FILM COATED ORAL at 11:05

## 2023-05-05 RX ADMIN — LIDOCAINE HYDROCHLORIDE,EPINEPHRINE BITARTRATE 3 ML: 15; .005 INJECTION, SOLUTION EPIDURAL; INFILTRATION; INTRACAUDAL; PERINEURAL at 09:05

## 2023-05-05 RX ADMIN — Medication 334 MILLI-UNITS/MIN: at 12:05

## 2023-05-05 RX ADMIN — Medication 8 ML/HR: at 09:05

## 2023-05-05 RX ADMIN — SODIUM CHLORIDE, POTASSIUM CHLORIDE, SODIUM LACTATE AND CALCIUM CHLORIDE: 600; 310; 30; 20 INJECTION, SOLUTION INTRAVENOUS at 11:05

## 2023-05-05 RX ADMIN — SODIUM CHLORIDE, POTASSIUM CHLORIDE, SODIUM LACTATE AND CALCIUM CHLORIDE 1000 ML: 600; 310; 30; 20 INJECTION, SOLUTION INTRAVENOUS at 08:05

## 2023-05-05 RX ADMIN — BUPIVACAINE HYDROCHLORIDE 1 ML: 2.5 INJECTION, SOLUTION INFILTRATION; PERINEURAL at 09:05

## 2023-05-05 NOTE — LACTATION NOTE
05/05/23 1715   Maternal Assessment   Breast Shape Bilateral:;round   Breast Density Bilateral:;soft   Areola Bilateral:;elastic   Nipples Bilateral:;graspable   Maternal Infant Feeding   Maternal Emotional State independent;relaxed   Infant Positioning cradle   Signs of Milk Transfer audible swallow;infant jaw motion present   Pain with Feeding no   Latch Assistance no   Equipment Type   Breast Pump Type double electric, personal  (call [email or web address] for pump)   Community Referrals   Community Referrals support group;pediatric care provider;outpatient lactation program  ([facility] info)     Basic lactation education reviewed, Breastfeeding guide at bedside. Mom independent with position and latch, baby nursing well with rhythmic sucking and swallows observed. LC number on board to call for assistance. Pt given Team My [employer] information to call for pump for home use.

## 2023-05-05 NOTE — ED PROVIDER NOTES
Encounter Date: 2023       History   No chief complaint on file.    HPI    Rina Keene is a 23 y.o. [id number] at 39w1d presents complaining of leakage of clear fluid around 7 am.     This IUP has been uncomplicated.  Patient reports contractions, denies vaginal bleeding, reports LOF.   Fetal Movement: normal.    Review of patient's allergies indicates:  No Known Allergies  Past Medical History:   Diagnosis Date    Asthma, currently inactive     seasonal    Scoliosis      Past Surgical History:   Procedure Laterality Date    VAGINAL DELIVERY  2017    Boy    WISDOM TOOTH EXTRACTION       Family History   Problem Relation Age of Onset    Miscarriages / Stillbirths Mother     Miscarriages / Stillbirths Maternal Grandmother     Diabetes Maternal Grandfather     Stroke Maternal Grandfather     Hypertension Maternal Grandfather     Miscarriages / Stillbirths Maternal Aunt     Breast cancer Neg Hx     Colon cancer Neg Hx     Ovarian cancer Neg Hx      labor Neg Hx     Cancer Neg Hx      Social History     Tobacco Use    Smoking status: Former     Types: Cigarettes    Smokeless tobacco: Never   Substance Use Topics    Alcohol use: Yes     Comment: social     Drug use: No     Review of Systems   Constitutional:  Negative for chills and fever.   HENT:  Negative for congestion and sore throat.    Eyes:  Negative for visual disturbance.   Respiratory:  Negative for shortness of breath.    Cardiovascular:  Negative for chest pain.   Gastrointestinal:  Positive for abdominal pain (contractions). Negative for constipation, diarrhea and nausea.   Genitourinary:  Positive for vaginal discharge (clear fluid). Negative for dysuria, frequency and vaginal bleeding.   Musculoskeletal:  Negative for back pain.   Skin:  Negative for rash.   Neurological:  Negative for weakness, light-headedness and headaches.   Hematological:  Does not bruise/bleed easily.   Psychiatric/Behavioral:  The patient is not nervous/anxious.       Physical Exam     Initial Vitals [05/05/23 0835]   BP Pulse Resp Temp SpO2   (!) 112/58 66 18 97.7 °F (36.5 °C) 99 %      MAP       --         Physical Exam    Vitals reviewed.  Constitutional: She appears well-developed and well-nourished.   HENT:   Head: Normocephalic and atraumatic.   Eyes: EOM are normal.   Neck: Neck supple.   Normal range of motion.  Cardiovascular:  Normal rate.           Pulmonary/Chest: No respiratory distress.   Abdominal: There is no abdominal tenderness.   gravid There is no guarding.   Musculoskeletal:      Cervical back: Normal range of motion and neck supple.     Neurological: She is alert and oriented to person, place, and time.   Skin: Skin is warm and dry.   Psychiatric: She has a normal mood and affect. Her behavior is normal. Thought content normal.     OB LABOR EXAM:   Pre-Term Labor: No.   Membranes ruptured: Yes.   Method: Sterile vaginal exam per MD and Sterile speculum exam per MD.       Dilatation: 4.   Station: -2.   Effacement: 70%.   Amniotic Fluid Color: clear.   Amniotic Fluid Amount: moderate.   Comments: Positive pooling and nitrazine     ED Course   Obtain Fetal nonstress test (NST)    Date/Time: 5/5/2023 8:52 AM  Performed by: Solomon Guzman MD  Authorized by: Solomon Guzman MD     Nonstress Test:     Variability:  6-25 BPM    Decelerations:  Variable    Accelerations:  15 bpm    Baseline:  140    Contractions:  Not present  Biophysical Profile:     Nonstress Test Interpretation: reactive      Overall Impression:  Reassuring  Post-procedure:     Patient tolerance:  Patient tolerated the procedure well with no immediate complications     Cephalic on BSS  Labs Reviewed - No data to display         Imaging Results    None            Medications   lactated ringers bolus 1,000 mL (1,000 mLs Intravenous New Bag 5/5/23 0897)   lactated ringers bolus 500 mL (has no administration in time range)   lactated ringers infusion (has no administration in time range)   0.9%   NaCl infusion (has no administration in time range)   oxytocin 30 units in 500 mL lactated ringers infusion (non-titrating) (has no administration in time range)   oxytocin 30 units in 500 mL lactated ringers infusion (non-titrating) (has no administration in time range)   tranexamic acid in NaCl,iso-os IVPB 1,000 mg (has no administration in time range)   ondansetron disintegrating tablet 8 mg (has no administration in time range)   prochlorperazine injection Soln 5 mg (has no administration in time range)   calcium carbonate 200 mg calcium (500 mg) chewable tablet 500 mg (has no administration in time range)   simethicone chewable tablet 80 mg (has no administration in time range)   LIDOcaine HCL 10 mg/ml (1%) injection 10 mL (has no administration in time range)   oxytocin 30 units in 500 mL lactated ringers infusion (non-titrating) (has no administration in time range)   oxytocin 30 units in 500 mL lactated ringers infusion (non-titrating) (has no administration in time range)   oxytocin injection 10 Units (has no administration in time range)   miSOPROStoL tablet 800 mcg (has no administration in time range)   miSOPROStoL tablet 800 mcg (has no administration in time range)   methylergonovine injection 200 mcg (has no administration in time range)   carboprost injection 250 mcg (has no administration in time range)   tranexamic acid in NaCl,iso-os IVPB 1,000 mg (has no administration in time range)     Medical Decision Making:   Initial Assessment:   22 yo  at 39w1d presenting with term PROM  ED Management:  Patient afebrile,VSS. NST reactive and reassuring.    PROM  - SSE: Positive pooling and nitrazine   - SVE: /-2  - Admit to Labor and Delivery unit  - Consents for delivery including vaginal or  section, and blood transfusion signed and to chart  - Risks, benefits, alternatives and possible complications have been discussed in detail with the patient.   - Epidural per Anesthesia  - Draw  CBC, T&S  - Notify Staff  - Recheck in 4 hrs or PRN            Attending Attestation:   Physician Attestation Statement for Resident:  As the supervising MD   Physician Attestation Statement: I have personally seen and examined this patient.   I agree with the above history.  -:   As the supervising MD I agree with the above PE.     As the supervising MD I agree with the above treatment, course, plan, and disposition.   -: I agree with the above edited resident note. Pt seen and examined, chart and labs reviewed.    Briefly, 22 yo  at 39w1d presenting with term PROM, 4cm dilation, NST Cat I reactive.    All questions answered. Admit to L&D for further mgmt.     Sandrine Aguila MD  OB Hospitalist  2023     I was personally present during the critical portions of the procedure(s) performed by the resident and was immediately available in the ED to provide services and assistance as needed during the entire procedure.  I have reviewed and agree with the residents interpretation of the following: lab data.  I have reviewed the following: old records at this facility.                         Clinical Impression:   Final diagnoses:  [Z3A.39] 39 weeks gestation of pregnancy  [O42.92] Full-term premature rupture of membranes, unspecified duration to onset of labor (Primary)        ED Disposition Condition    Send to L&D Stable                Sandrine Aguila MD  [phone]

## 2023-05-05 NOTE — PROGRESS NOTES
05/05/23 0956   [facility] Note - Strip   Strip Reviewed by [facility] Nurse? Yes   [facility] Note - Communication   [place] Nurse Communicated with Bedside Nurse Regarding: Fetal Status   [facility] Note - Notification   Nurse Notified? Yes  (will assess)   Name of Nurse Kathy

## 2023-05-05 NOTE — L&D DELIVERY NOTE
[facility] - Labor & Delivery  Vaginal Delivery   Operative Note    SUMMARY     Normal spontaneous vaginal delivery of live infant,complicated by 1 minute 15 second shoulder dystocia.  Skin to skin was unable to be performed initially due to need for NICU evaluation, but after baby was cleared skin-to-skin was started (within 15 minutes of delivery).  Infant delivered position REENA over intact perineum.  Nuchal cord: Yes, cord reduced at perineum.    After reduction of nuchal cord, normal traction did not lead to delivery of the fetal head. Shoulder dystocia called. Pt placed into Valarie position and suprapubic pressure applied from the maternal left. In combination with Reubin's maneuver the anterior fetal shoulder delivered 1 minute and 15 seconds after delivery of the head. The remainder of the infant delivered without difficulty.    Spontaneous delivery of placenta and IV pitocin given noting good uterine tone.  2nd degree laceration noted and repaired in normal fashion with 3-0 chromic.    Patient tolerated delivery well. Sponge needle and lap counted correctly x2.    Indications: Full-term premature rupture of membranes  Pregnancy complicated by:   Patient Active Problem List   Diagnosis    Scoliosis    First pregnancy in adolescent 16 years of age or older in second trimester    Back pain affecting pregnancy, antepartum    Full-term premature rupture of membranes     Admitting GA: 39w1d    Delivery Information for Umesh Keene    Birth information:  YOB: 2025   Time of birth: 12:52 PM   Sex: male   Head Delivery Date/Time: 5/5/2023 12:51 PM   Delivery type: Vaginal, Spontaneous   Gestational Age: 39w1d  Unknown    Delivery Providers    Delivering clinician: Jacqueline Perez MD   Provider Role    Ly Park RN Delivery Nurse    Isabel Lewis RN Pediatric Nursery    Kathy Engle RN Charge Nurse    Pearl Kemp RN Nurse Manager              Measurements    Weight: 3470  "g  Weight (lbs): 7 lb 10.4 oz  Length: 52.1 cm  Length (in): 20.5"  Head circumference: 32.4 cm  Chest circumference: 34.3 cm         Apgars    Living status: Living  Apgars:  1 min.:  5 min.:  10 min.:  15 min.:  20 min.:    Skin color:  0  1       Heart rate:  2  2       Reflex irritability:  2  2       Muscle tone:  2  2       Respiratory effort:  2  2       Total:  8  9       Apgars assigned by: NICU         Operative Delivery    Forceps attempted?: No  Vacuum extractor attempted?: No         Shoulder Dystocia    Shoulder dystocia present?: Yes  Anterior shoulder: right  Time recognized: 2023 12:51:02  Time help called: 2023 12:51:05   Help called by: CADEN Lewis RN     Physician/Provider arrived: 2023 12:40:00   Physician/Provider: MD Mercedes     NICU arrived: 2023 12:53:00     Additional staff: MD Miles     Gentle attempt at traction, assisted by maternal expulsive forces?: Yes   First maneuver: Valarie maneuver, suprapubic pressure, Cotto maneuver  First maneuver performed at: 2023 12:52:08  First maneuver performed by: MD Omid           Presentation    Presentation: Vertex  Position: Left Occiput Anterior           Interventions/Resuscitation    Method: Bulb Suctioning, Tactile Stimulation, Deep Suctioning, NICU Attended       Cord    Vessels: 3 vessels  Complications: Nuchal  Nuchal Intervention: reduced  Nuchal Cord Description: loose nuchal cord  Number of Loops: 1  Delayed Cord Clamping?: No  Cord Clamped Date/Time: 2023 12:53 PM  Cord Blood Disposition: Sent with Baby  Gases Sent?: No  Stem Cell Collection (by MD): No       Placenta    Placenta delivery date/time: 2023 1257  Placenta removal: Spontaneous  Placenta appearance: Intact  Placenta disposition: Discarded           Labor Events:       labor: No     Labor Onset Date/Time:         Dilation Complete Date/Time:         Start Pushing Date/Time:         Start Pushing Date/Time:       Rupture " Date/Time: 23         Rupture type: SRM (Spontaneous Rupture)         Fluid Amount: Moderate      Fluid Color: Clear               steroids:       Antibiotics given for GBS: No     Induction: none     Indications for induction:        Augmentation:       Indications for augmentation:       Labor complications: Shoulder Dystocia     Additional complications:          Cervical ripening:                     Delivery:      Episiotomy: None     Indication for Episiotomy:       Perineal Lacerations: 2nd Repaired:  Yes   Periurethral Laceration:   Repaired:     Labial Laceration:   Repaired:     Sulcus Laceration:   Repaired:     Vaginal Laceration:   Repaired:     Cervical Laceration:   Repaired:     Repair suture:       Repair # of packets: 1     Last Value - EBL - Nursing (mL):       Sum - EBL - Nursing (mL): 0     Last Value - EBL - Anesthesia (mL):        Calculated QBL (mL): 200      Vaginal Sweep Performed: Yes     Surgicount Correct: Yes     Vaginal Packing: No Quantity:       Other providers:       Anesthesia    Method: Epidural, Spinal          Details (if applicable):  Trial of Labor      Categorization:      Priority:     Indications for :     Incision Type:       Additional  information:  Forceps:    Vacuum:    Breech:    Observed anomalies    Other (Comments):

## 2023-05-05 NOTE — H&P
HISTORY AND PHYSICAL                                                OBSTETRICS          Subjective:       Rina Keene is a 23 y.o.  female with IUP at 39w1d weeks gestation who presents with term PROM.    Patient reports contractions, denies vaginal bleeding, reports LOF.   Fetal Movement: normal.    This IUP has been uncomplicated    Review of Systems   Constitutional:  Negative for chills and fever.   HENT:  Negative for nasal congestion.    Eyes:  Negative for visual disturbance.   Respiratory:  Negative for cough and shortness of breath.    Cardiovascular:  Negative for chest pain.   Gastrointestinal:  Positive for abdominal pain (contractions). Negative for constipation, diarrhea, nausea and vomiting.   Endocrine: Negative for hot flashes.   Genitourinary:  Positive for vaginal discharge (clear fluid). Negative for dysuria, frequency, menstrual problem, urgency and vaginal bleeding.   Musculoskeletal:  Negative for myalgias.   Integumentary:  Negative for rash, breast mass, nipple discharge and breast skin changes.   Neurological:  Negative for headaches.   Hematological:  Does not bruise/bleed easily.   Psychiatric/Behavioral:  Negative for depression. The patient is not nervous/anxious.    Breast: Negative for mass, mastodynia, nipple discharge and skin changes    Past Medical/Surgical Hx: reviewed in chart  Social/Family Hx: reviewed in chart  Allergies: reviewed in chart  Meds:   Medications Prior to Admission   Medication Sig Dispense Refill Last Dose    fluticasone propionate (FLONASE) 50 mcg/actuation nasal spray 2 sprays by Each Nostril route as needed.       iron bis-gly/FA/C/B12/Ca/succ (IRON-150 ORAL) Take by mouth.       polyethylene glycol 3350 (MIRALAX ORAL) Take by mouth.       prenatal 25/iron fum/folic/dha (PRENATAL-1 ORAL) Take by mouth.        OBHx:   OB History    Para Term  AB Living   2 1 1 0 0 1   SAB IAB Ectopic Multiple Live Births   0 0 0 0 1      #  Outcome Date GA Lbr Stuart/2nd Weight Sex Delivery Anes PTL Lv   2 Current            1 Term 17 38w6d  2.92 kg (6 lb 7 oz) M Vag-Spont EPI N FELICIA      Name: Phoenix Lee      Apgar1: 8  Apgar5: 9      Obstetric Comments   Menarche ~10       Objective:       /68   Pulse 78   Temp 97.7 °F (36.5 °C) (Oral)   Resp 18   LMP 2022   SpO2 100%   Physical Exam  Constitutional:       General: She is not in acute distress.     Appearance: Normal appearance.   Cardiovascular:      Rate and Rhythm: Normal rate.   Pulmonary:      Effort: Pulmonary effort is normal. No respiratory distress.   Abdominal:      Palpations: Abdomen is soft. There is mass (Gravid). There is no hepatomegaly or splenomegaly.      Tenderness: There is no abdominal tenderness. There is no guarding or rebound.      Hernia: No hernia is present.   Musculoskeletal:      Cervical back: Normal range of motion and neck supple.      Right lower leg: No edema.      Left lower leg: No edema.   Neurological:      Mental Status: She is alert and oriented to person, place, and time.   Skin:     Findings: No rash.   Psychiatric:         Mood and Affect: Mood and affect normal.        FHT: 140BPM/moderate variability/+accels/-decels   CTX: q 2-3 minutes     Dilation (cm): 4  Effacement (%): 70  Station: -2   Presentation: cephalic  EFW by Leopold's: 6.5lbs    Lab Review  Lab Results   Component Value Date    GROUPTRH A POS 10/07/2022    INDIRECTCOOM NEG 10/07/2022    HGB 11.5 (L) 2023    HCT 33.7 (L) 2023     2023    RPR Non-reactive 04/10/2023    SSI49VWEX Non-reactive 04/10/2023    HEPBSAG Non-reactive 10/07/2022    RUBELLAIMMUN Reactive 10/07/2022          Assessment:        23 y.o. [id number] 39w1d weeks gestation who presents with PROM    Active Hospital Problems    Diagnosis  POA    *Full-term premature rupture of membranes [O42.92]  Unknown      Resolved Hospital Problems   No resolved problems to display.          Plan:    Term PROM   Risks, benefits, alternatives and possible complications have been discussed in detail with the patient.   - Consents signed and to chart  - Admit to Labor and Delivery unit  - Epidural per Anesthesia  - Draw CBC, T&S  - Notify Staff  - Recheck in 4 hrs or PRN    Post-Partum Hemorrhage risk - low    Solomon Guzman MD  OBGYN PGY-1       Attending Addendum  I agree with the above edited resident note. Pt seen and examined, chart and labs reviewed.    Briefly, 24 yo  at 39w1d admitted with term PROM at 0700, clear, GBS neg.    All questions answered. Admit to L&D for further mgmt.     Sandrine Aguila MD  OB Hospitalist  2023

## 2023-05-05 NOTE — PLAN OF CARE
This patient has been screened for Social Work discharge planning needs. Based on  documentation in medical record , no discharge planning needs are anticipated at this time. Should any discharge planning needs arise, please consult . For urgent needs/consults, contact the  via phone.        [phone]   OB SCREEN   Assessment Type Discharge Planning Assessment   Source of Information health record   Received Prenatal Care Yes   Any indications/suspicions for None   Is this a teen pregnancy No   Is the baby in NICU No   Indication for adoption/Safe Haven No   Indication for DME/post-acute needs No   HIV (+) No   Any congenital  disorders No   Fetal demise/ death No

## 2023-05-05 NOTE — ANESTHESIA PROCEDURE NOTES
CSE    Patient location during procedure: OB  Start time: 5/5/2023 9:20 AM  Timeout: 5/5/2023 9:19 AM  End time: 5/5/2023 9:30 AM      Staffing  Authorizing Provider: Luzma Still MD  Performing Provider: Stephan Knight MD    Preanesthetic Checklist  Completed: patient identified, IV checked, site marked, risks and benefits discussed, surgical consent, monitors and equipment checked, pre-op evaluation and timeout performed  CSE  Patient position: sitting  Prep: ChloraPrep  Patient monitoring: heart rate, continuous pulse ox and frequent blood pressure checks  Approach: midline  Spinal Needle  Needle type: [place]   Needle gauge: 25 G  Needle length: 5 in  Epidural Needle  Injection technique: SAMMIE air  Needle type: Tuohy   Needle gauge: 17 G  Needle length: 3.5 in  Needle insertion depth: 4 cm  Location: L4-5  Needle localization: anatomical landmarks   Catheter  Catheter type: [employer]  Catheter size: 19 G  Catheter at skin depth: 9 cm  Test dose: lidocaine 1.5% with Epi 1-to-200,000  Test dose: 3 mL  Additional Documentation: incremental injection, no paresthesia on injection, negative aspiration for CSF, negative aspiration for heme and negative test dose  Assessment  Intrathecal Medications:   administered: primary anesthetic mcg of    Additional Notes  Small amount of fluid back through the epidural needle after SAMMIE. May be representative of a dural puncture.

## 2023-05-05 NOTE — CARE UPDATE
Resident to bedside for prolonged deceleration. Prior Cat 1 tracing with dip to the 50s. Patient had epidural placement just recently. When I entered room, anesthesia resident bedside pushing phenylephrine. Blood pressure 100/56. Maternal repositioning performed without recovery for 2-3 minutes. Patient tachysystole with contractions q1 minute. At minute 4 terbutaline called into the room. Repeat cervical check at that time 6/90/-1. Terbutaline WAS GIVEN at minute 5. FHT had still not recovered so decision was made to pass patient back to OR at minute 7. Chief resident, on call staff, and additional nurses notified. In OR patient slowly recovered to baseline 130. Repeat cervical exam in OR 8/90/0. Fluid bolus given, BP appropriate. Patient moved back to L&D room. Will reassess in 2 hours.     Bhavani Landrum MD PGY-1  Obstetrics and Gynecology  [facility]

## 2023-05-06 LAB
BASOPHILS # BLD AUTO: 0.02 K/UL (ref 0–0.2)
BASOPHILS NFR BLD: 0.2 % (ref 0–1.9)
DIFFERENTIAL METHOD: ABNORMAL
EOSINOPHIL # BLD AUTO: 0 K/UL (ref 0–0.5)
EOSINOPHIL NFR BLD: 0.3 % (ref 0–8)
ERYTHROCYTE [DISTWIDTH] IN BLOOD BY AUTOMATED COUNT: 13.1 % (ref 11.5–14.5)
HCT VFR BLD AUTO: 30.1 % (ref 37–48.5)
HGB BLD-MCNC: 10.2 G/DL (ref 12–16)
IMM GRANULOCYTES # BLD AUTO: 0.03 K/UL (ref 0–0.04)
IMM GRANULOCYTES NFR BLD AUTO: 0.3 % (ref 0–0.5)
LYMPHOCYTES # BLD AUTO: 1.9 K/UL (ref 1–4.8)
LYMPHOCYTES NFR BLD: 21.6 % (ref 18–48)
MCH RBC QN AUTO: 31.7 PG (ref 27–31)
MCHC RBC AUTO-ENTMCNC: 33.9 G/DL (ref 32–36)
MCV RBC AUTO: 94 FL (ref 82–98)
MONOCYTES # BLD AUTO: 0.6 K/UL (ref 0.3–1)
MONOCYTES NFR BLD: 7 % (ref 4–15)
NEUTROPHILS # BLD AUTO: 6.3 K/UL (ref 1.8–7.7)
NEUTROPHILS NFR BLD: 70.6 % (ref 38–73)
NRBC BLD-RTO: 0 /100 WBC
PLATELET # BLD AUTO: 189 K/UL (ref 150–450)
PMV BLD AUTO: 10.5 FL (ref 9.2–12.9)
RBC # BLD AUTO: 3.22 M/UL (ref 4–5.4)
WBC # BLD AUTO: 8.89 K/UL (ref 3.9–12.7)

## 2023-05-06 PROCEDURE — 99232 SBSQ HOSP IP/OBS MODERATE 35: CPT | Mod: ,,, | Performed by: OBSTETRICS & GYNECOLOGY

## 2023-05-06 PROCEDURE — 99232 PR SUBSEQUENT HOSPITAL CARE,LEVL II: ICD-10-PCS | Mod: ,,, | Performed by: OBSTETRICS & GYNECOLOGY

## 2023-05-06 PROCEDURE — 36415 COLL VENOUS BLD VENIPUNCTURE: CPT | Performed by: OBSTETRICS & GYNECOLOGY

## 2023-05-06 PROCEDURE — 85025 COMPLETE CBC W/AUTO DIFF WBC: CPT | Performed by: OBSTETRICS & GYNECOLOGY

## 2023-05-06 PROCEDURE — 11000001 HC ACUTE MED/SURG PRIVATE ROOM

## 2023-05-06 PROCEDURE — 25000003 PHARM REV CODE 250: Performed by: OBSTETRICS & GYNECOLOGY

## 2023-05-06 RX ADMIN — IBUPROFEN 600 MG: 600 TABLET, FILM COATED ORAL at 05:05

## 2023-05-06 RX ADMIN — IBUPROFEN 600 MG: 600 TABLET, FILM COATED ORAL at 11:05

## 2023-05-06 RX ADMIN — IBUPROFEN 600 MG: 600 TABLET, FILM COATED ORAL at 04:05

## 2023-05-06 RX ADMIN — PRENATAL VIT W/ FE FUMARATE-FA TAB 27-0.8 MG 1 TABLET: 27-0.8 TAB at 08:05

## 2023-05-06 NOTE — PLAN OF CARE
VSS. Uterus firm w/o massage. Bleeding continues to improve. Pt ambulating independently. Voiding spontaneously, passing flatus. Pain managed adequately w/ medication. Plan of care reviewed with pt and spouse. No new concerns expressed at this time. D/C teaching completed. Will continue to monitor and intervene as necessary.

## 2023-05-06 NOTE — PROGRESS NOTES
POSTPARTUM PROGRESS NOTE    Subjective:     PPD/POD#: 1   Procedure:    EGA: 39w1d   N/V: No   F/C: No   Abd Pain: Mild, well-controlled with oral pain medication   Lochia: Mild   Voiding: Yes   Ambulating: Yes   Bowel fnc: Yes   Breastfeeding: Yes   Contraception: Per primary OB   Circumcision: Consented.  Examined and approved by OB attending.     Objective:      Temp:  [98.1 °F (36.7 °C)-98.7 °F (37.1 °C)] 98.5 °F (36.9 °C)  Pulse:  [60-95] 76  Resp:  [17-18] 18  SpO2:  [96 %-99 %] 96 %  BP: ()/(52-63) 107/63    Lung: Normal respiratory effort   Abdomen: Soft, appropriately tender   Uterus: Firm, no fundal tenderness   Incision: N/A   : Deferred   Extremities: Bilateral trace edema     Lab Review    No results for input(s): NA, K, CL, CO2, BUN, CREATININE, GLU, PROT, BILITOT, ALKPHOS, ALT, AST, MG, PHOS in the last 168 hours.    Recent Labs   Lab 23  0851 23  0550   WBC 9.69 8.89   HGB 11.5* 10.2*   HCT 33.7* 30.1*   MCV 93 94    189         I/O    Intake/Output Summary (Last 24 hours) at 2023 1311  Last data filed at 2023 1955  Gross per 24 hour   Intake --   Output 1700 ml   Net -1700 ml        Assessment and Plan:   Postpartum care:  - Patient doing well.  - Continue routine management and advances.  - Infant examined and cleared for circumcision.  - Will discharge to home tomorrow.    Andreia Shipman MD,DARNELL  Date and Time: 2023 1:11 PM  OB Hospitalist

## 2023-05-06 NOTE — PLAN OF CARE
Pt ambulating and voiding without difficulty. Patient safety maintained, side rails up, bed low and locked position.  Pain well controlled with PRN pain medication. Fundus midline, firm, with moderate lochia. VSS. Significant other at bedside; parents responding to infant cues and bonding appropriately. Will continue to monitor.      Problem:  Fall Injury Risk  Goal: Absence of Fall, Infant Drop and Related Injury  Outcome: Ongoing, Progressing     Problem: Adult Inpatient Plan of Care  Goal: Plan of Care Review  Outcome: Ongoing, Progressing  Goal: Patient-Specific Goal (Individualized)  Outcome: Ongoing, Progressing  Goal: Absence of Hospital-Acquired Illness or Injury  Outcome: Ongoing, Progressing  Goal: Optimal Comfort and Wellbeing  Outcome: Ongoing, Progressing  Goal: Readiness for Transition of Care  Outcome: Ongoing, Progressing     Problem: Infection  Goal: Absence of Infection Signs and Symptoms  Outcome: Ongoing, Progressing     Problem: Breastfeeding  Goal: Effective Breastfeeding  Outcome: Ongoing, Progressing

## 2023-05-06 NOTE — PLAN OF CARE
POC reviewed with pt throughout the shift; all questions answered. Pt ambulating, voiding, and passing flatus. Continues to tolerate PO well with no SS of distress at this time. Pain well controlled throughout shift by oral pain medication. Bleeding remains light and fundus is firm.  Safety maintained per unit protocol. See flowsheets for additional information.

## 2023-05-06 NOTE — LACTATION NOTE
Lactation Round: Pt shared that baby continues to nurse well. All questions answered. PT aware to contact  for further assistance.

## 2023-05-06 NOTE — ANESTHESIA POSTPROCEDURE EVALUATION
Anesthesia Post Evaluation    Patient: Rina PEARSON Ponthieux    Procedure(s) Performed: * No procedures listed *    Final Anesthesia Type: CSE      Patient location during evaluation: labor & delivery  Patient participation: Yes- Able to Participate  Level of consciousness: awake and alert  Post-procedure vital signs: reviewed and stable  Pain management: adequate  Airway patency: patent  NILA mitigation strategies: Use of major conduction anesthesia (spinal/epidural) or peripheral nerve block  PONV status at discharge: No PONV  Anesthetic complications: no      Cardiovascular status: blood pressure returned to baseline  Respiratory status: unassisted  Hydration status: euvolemic  Follow-up not needed.          Vitals Value Taken Time   /63 05/06/23 0910   Temp 36.9 °C (98.5 °F) 05/06/23 0910   Pulse 76 05/06/23 0910   Resp 18 05/06/23 0910   SpO2 96 % 05/06/23 0910         No case tracking events are documented in the log.      Pain/Chelle Score: Pain Rating Prior to Med Admin: 5 (5/6/2023  5:39 AM)  Pain Rating Post Med Admin: 0 (5/5/2023  9:00 AM)

## 2023-05-07 VITALS
OXYGEN SATURATION: 98 % | HEART RATE: 61 BPM | WEIGHT: 138 LBS | BODY MASS INDEX: 27.09 KG/M2 | DIASTOLIC BLOOD PRESSURE: 58 MMHG | TEMPERATURE: 98 F | RESPIRATION RATE: 18 BRPM | HEIGHT: 60 IN | SYSTOLIC BLOOD PRESSURE: 99 MMHG

## 2023-05-07 PROCEDURE — 99238 PR HOSPITAL DISCHARGE DAY,<30 MIN: ICD-10-PCS | Mod: ,,, | Performed by: OBSTETRICS & GYNECOLOGY

## 2023-05-07 PROCEDURE — 25000003 PHARM REV CODE 250: Performed by: OBSTETRICS & GYNECOLOGY

## 2023-05-07 PROCEDURE — 99238 HOSP IP/OBS DSCHRG MGMT 30/<: CPT | Mod: ,,, | Performed by: OBSTETRICS & GYNECOLOGY

## 2023-05-07 RX ORDER — IBUPROFEN 600 MG/1
600 TABLET ORAL EVERY 6 HOURS PRN
Qty: 60 TABLET | Refills: 0 | Status: SHIPPED | OUTPATIENT
Start: 2023-05-07

## 2023-05-07 RX ORDER — DOCUSATE SODIUM 100 MG/1
100 CAPSULE, LIQUID FILLED ORAL 2 TIMES DAILY PRN
Qty: 60 CAPSULE | Refills: 0 | Status: SHIPPED | OUTPATIENT
Start: 2023-05-07 | End: 2023-11-03

## 2023-05-07 RX ADMIN — DOCUSATE SODIUM 200 MG: 100 CAPSULE, LIQUID FILLED ORAL at 08:05

## 2023-05-07 RX ADMIN — IBUPROFEN 600 MG: 600 TABLET, FILM COATED ORAL at 10:05

## 2023-05-07 RX ADMIN — PRENATAL VIT W/ FE FUMARATE-FA TAB 27-0.8 MG 1 TABLET: 27-0.8 TAB at 08:05

## 2023-05-07 RX ADMIN — IBUPROFEN 600 MG: 600 TABLET, FILM COATED ORAL at 05:05

## 2023-05-07 NOTE — PLAN OF CARE
Patient in no apparent distress. VSS. Ambulating without difficulty. No needs at this time. Discharge papers signed. Mother Baby care guide reviewed. All questions answered.       Problem:  Fall Injury Risk  Goal: Absence of Fall, Infant Drop and Related Injury  Outcome: Met     Problem: Adult Inpatient Plan of Care  Goal: Plan of Care Review  Outcome: Met  Goal: Patient-Specific Goal (Individualized)  Outcome: Met  Goal: Absence of Hospital-Acquired Illness or Injury  Outcome: Met  Goal: Optimal Comfort and Wellbeing  Outcome: Met  Goal: Readiness for Transition of Care  Outcome: Met     Problem: Infection  Goal: Absence of Infection Signs and Symptoms  Outcome: Met     Problem: Breastfeeding  Goal: Effective Breastfeeding  Outcome: Met

## 2023-05-07 NOTE — LACTATION NOTE
Lactation discharge education completed. Plan of care is for pt to follow basic breastfeeding education, frequent feeding based on baby's cues, and to monitor baby's voids and stools. Breastfeeding guide, including First Alert survey, resource list, and lactation warmline phone number reviewed. Pt shared that baby continues to nurse well. LC reinforced signs of nutritive versus non-nutritive feedings. LC encouraged Pt to call prior to discharge for latch assessment. Pt to notify doctor for maternal or infant concerns, as reviewed with LC. Pt verbalizes understanding and questions answered.

## 2023-05-07 NOTE — PROGRESS NOTES
POSTPARTUM PROGRESS NOTE    Subjective:     PPD#: 2   Procedure:    EGA: 39w1d   N/V: No   F/C: No   Abd Pain: Mild, well-controlled with oral pain medication   Lochia: Mild   Voiding: Yes   Ambulating: Yes   Bowel fnc: Yes   Breastfeeding: Yes   Contraception: Per primary OB   Circumcision: Consented.  Examined and approved by OB attending.     Objective:      Temp:  [97.9 °F (36.6 °C)-98 °F (36.7 °C)] 97.9 °F (36.6 °C)  Pulse:  [61-79] 61  Resp:  [18] 18  SpO2:  [97 %-98 %] 98 %  BP: (99)/(58-64) 99/58    Lung: Normal respiratory effort   Abdomen: Soft, appropriately tender   Uterus: Firm, no fundal tenderness       : Deferred   Extremities: Bilateral trace edema     Lab Review    No results for input(s): NA, K, CL, CO2, BUN, CREATININE, GLU, PROT, BILITOT, ALKPHOS, ALT, AST, MG, PHOS in the last 168 hours.    Recent Labs   Lab 23  0851 23  0550   WBC 9.69 8.89   HGB 11.5* 10.2*   HCT 33.7* 30.1*   MCV 93 94    189         I/O  No intake or output data in the 24 hours ending 23 0918     Assessment and Plan:   Rina Keene is a 23 y.o. yo  s/p  PPD #2     Postpartum care  -Patient doing well and meeting appropriate milestones  -Pain controlled  -Ambulating, voiding, dion PO  -Breastfeeding w/o issues  -Baby at bedside, doing well, for circ today  -Stable for d/c home with outpatient follow up    Sandrine Aguila MD  OB Hospitalist   Date and Time: 2023 9:18 AM

## 2023-05-07 NOTE — DISCHARGE SUMMARY
Delivery Discharge Summary  Obstetrics      Primary OB Clinician: Jacqueline Perez, *      Admission date: 2023  Discharge date: 2023    Disposition: To home, self care    Discharge Diagnosis List:      Patient Active Problem List   Diagnosis    Scoliosis    First pregnancy in adolescent 16 years of age or older in second trimester    Back pain affecting pregnancy, antepartum    Full-term premature rupture of membranes    Postpartum state       Procedure:     Hospital Course:  Rina Keene is a 23 y.o. now , PPD #2 who was admitted on 2023 at 39w1d for term PROM and early labor. Patient was subsequently admitted to labor and delivery unit with signed consents.     Labor course was uncomplicated and resulted in  without complications.     Please see delivery note for further details.     Her postpartum course was uncomplicated.     On discharge day, patient's pain is controlled with oral pain medications. Pt is tolerating ambulation without SOB or CP, and regular diet without N/V. Reports lochia is mild. Denies any HA, vision changes, F/C, LE swelling. Denies any breast pain/soreness.    Pt in stable condition and ready for discharge. She has been instructed to start and/or continue medications and follow up with her obstetrics provider as listed below.    Pertinent studies:  CBC  Recent Labs   Lab 23  0851 23  0550   WBC 9.69 8.89   HGB 11.5* 10.2*   HCT 33.7* 30.1*   MCV 93 94    189        Immunization History   Administered Date(s) Administered    DTaP 1998, 1998, 1998, 10/12/1999, 2002    HIB 1998, 1998, 1999    HPV Quadrivalent 2012, 2013, 2014    Hepatitis A, Pediatric/Adolescent, 2 Dose 2009    Hepatitis B, Pediatric/Adolescent 1998, 1998, 1999    IPV 1998, 1998, 1999, 2002    Influenza - Intranasal 2009    Influenza - Quadrivalent - PF  *Preferred* (6 months and older) 10/16/2006, 10/27/2008, 12/13/2010, 12/11/2012, 11/19/2014, 11/29/2022    Influenza - Trivalent - PF (ADULT) 01/20/2014    Influenza Split 12/11/2012    MMR 10/12/1999, 05/11/2002    Meningococcal Conjugate (MCV4P) 05/01/2009, 08/04/2014    PPD Test 06/12/2013    Tdap 05/01/2009, 05/07/2015, 07/14/2017, 02/24/2023    Varicella 04/03/1999, 05/01/2009        Delivery:    Episiotomy: None   Lacerations: 2nd   Repair suture:     Repair # of packets: 1   Blood loss (ml):       Birth information:  YOB: 2025   Time of birth: 12:52 PM   Sex: male   Delivery type: Vaginal, Spontaneous   Gestational Age: 39w1d    Delivery Clinician:      Other providers:       Additional  information:  Forceps:    Vacuum:    Breech:    Observed anomalies      Living?:           APGARS  One minute Five minutes Ten minutes   Skin color:         Heart rate:         Grimace:         Muscle tone:         Breathing:         Totals: 8  9        Placenta: Delivered:       appearance    Patient Instructions:   Current Discharge Medication List        START taking these medications    Details   docusate sodium (COLACE) 100 MG capsule Take 1 capsule (100 mg total) by mouth 2 (two) times daily as needed for Constipation.  Qty: 60 capsule, Refills: 0      ibuprofen (ADVIL,MOTRIN) 600 MG tablet Take 1 tablet (600 mg total) by mouth every 6 (six) hours as needed (cramping).  Qty: 60 tablet, Refills: 0           CONTINUE these medications which have NOT CHANGED    Details   fluticasone propionate (FLONASE) 50 mcg/actuation nasal spray 2 sprays by Each Nostril route as needed.      iron bis-gly/FA/C/B12/Ca/succ (IRON-150 ORAL) Take by mouth.      polyethylene glycol 3350 (MIRALAX ORAL) Take by mouth.      prenatal 25/iron fum/folic/dha (PRENATAL-1 ORAL) Take by mouth.             Discharge Procedure Orders   BREAST PUMP FOR HOME USE     Order Specific Question Answer Comments   Type of pump: Electric    Weight:  62.6 kg (138 lb)    Length of need (1-99 months): 99      Diet Adult Regular     Pelvic Rest     Notify your health care provider if you experience any of the following:  temperature >100.4     Notify your health care provider if you experience any of the following:  persistent nausea and vomiting or diarrhea     Notify your health care provider if you experience any of the following:  severe uncontrolled pain     Notify your health care provider if you experience any of the following:  difficulty breathing or increased cough     Notify your health care provider if you experience any of the following:  severe persistent headache     Notify your health care provider if you experience any of the following:  persistent dizziness, light-headedness, or visual disturbances     Activity as tolerated        Follow-up Information       Jacqueline Perez MD Follow up in 6 week(s).    Specialty: Obstetrics and Gynecology  Why: 6 weeks for routine postpartum visit  Contact information:  [id number]  [address] [id number] [phone]

## 2023-05-07 NOTE — PLAN OF CARE
VSS. Uterus firm w/o massage. Bleeding continues to improve. Pt ambulating independently. Voiding spontaneously, passing flatus. Pain managed adequately w/ medication. Plan of care reviewed with pt. No new concerns expressed at this time. D/C teaching completed. Will continue to monitor and intervene as necessary.

## 2023-05-08 ENCOUNTER — PATIENT MESSAGE (OUTPATIENT)
Dept: OBSTETRICS AND GYNECOLOGY | Facility: OTHER | Age: 23
End: 2023-05-08
Payer: MEDICAID

## 2023-05-09 ENCOUNTER — HOSPITAL ENCOUNTER (EMERGENCY)
Facility: HOSPITAL | Age: 23
Discharge: HOME | End: 2023-05-09
Attending: EMERGENCY MEDICINE
Payer: MEDICAID

## 2023-05-09 VITALS
DIASTOLIC BLOOD PRESSURE: 72 MMHG | OXYGEN SATURATION: 99 % | RESPIRATION RATE: 18 BRPM | TEMPERATURE: 98 F | SYSTOLIC BLOOD PRESSURE: 110 MMHG | HEART RATE: 57 BPM

## 2023-05-09 DIAGNOSIS — G97.1 SPINAL PUNCTURE HEADACHE: Primary | ICD-10-CM

## 2023-05-09 PROCEDURE — 96361 HYDRATE IV INFUSION ADD-ON: CPT

## 2023-05-09 PROCEDURE — 96374 THER/PROPH/DIAG INJ IV PUSH: CPT

## 2023-05-09 PROCEDURE — 99284 EMERGENCY DEPT VISIT MOD MDM: CPT | Mod: 25

## 2023-05-09 PROCEDURE — 25000003 PHARM REV CODE 250: Performed by: EMERGENCY MEDICINE

## 2023-05-09 PROCEDURE — 96375 TX/PRO/DX INJ NEW DRUG ADDON: CPT

## 2023-05-09 PROCEDURE — 63600175 PHARM REV CODE 636 W HCPCS: Performed by: EMERGENCY MEDICINE

## 2023-05-09 RX ORDER — METOCLOPRAMIDE HYDROCHLORIDE 5 MG/ML
10 INJECTION INTRAMUSCULAR; INTRAVENOUS
Status: COMPLETED | OUTPATIENT
Start: 2023-05-09 | End: 2023-05-09

## 2023-05-09 RX ORDER — NAPROXEN 500 MG/1
500 TABLET ORAL 2 TIMES DAILY WITH MEALS
Qty: 20 TABLET | Refills: 0 | Status: SHIPPED | OUTPATIENT
Start: 2023-05-09 | End: 2023-06-16

## 2023-05-09 RX ORDER — KETOROLAC TROMETHAMINE 30 MG/ML
15 INJECTION, SOLUTION INTRAMUSCULAR; INTRAVENOUS
Status: COMPLETED | OUTPATIENT
Start: 2023-05-09 | End: 2023-05-09

## 2023-05-09 RX ADMIN — SODIUM CHLORIDE 1000 ML: 9 INJECTION, SOLUTION INTRAVENOUS at 09:05

## 2023-05-09 RX ADMIN — METOCLOPRAMIDE 10 MG: 5 INJECTION, SOLUTION INTRAMUSCULAR; INTRAVENOUS at 07:05

## 2023-05-09 RX ADMIN — SODIUM CHLORIDE 1000 ML: 9 INJECTION, SOLUTION INTRAVENOUS at 07:05

## 2023-05-09 RX ADMIN — KETOROLAC TROMETHAMINE 15 MG: 30 INJECTION, SOLUTION INTRAMUSCULAR; INTRAVENOUS at 07:05

## 2023-05-09 NOTE — ED NOTES
Pt reports delivering a baby 05/05/2023 and has had a headache since then. Pt has tried OTC medications with no relief.

## 2023-05-10 NOTE — DISCHARGE INSTRUCTIONS
Limit activity and lay flat in bed.  If you stand for prolonged periods of time you will get return of your headache.

## 2023-05-10 NOTE — ED PROVIDER NOTES
Encounter Date: 2023    SCRIBE #1 NOTE: I, Orquidea Desir, am scribing for, and in the presence of,  Rylan East MD.     History     Chief Complaint   Patient presents with    Headache     Pt delivered a baby yesterday.  States she has had a headache since then that is not relieved with OTC medications.       Time seen by provider: 7:10 PM on 2023    Rina Keene is a 23 y.o. female who presents to the ED with an onset of frontal headache starting yesterday after she gave birth. Pt reports that she got an spinal anesthesia (epidural) yesterday for delivery. The patient denies any other symptoms at this time. Pt denies having any known allergies. Pt reports that she had a busy day today. PMHx of scoliosis. PSHx of vaginal delivery.       The history is provided by the patient.   Review of patient's allergies indicates:  No Known Allergies  Past Medical History:   Diagnosis Date    Asthma, currently inactive     seasonal    Scoliosis      Past Surgical History:   Procedure Laterality Date    VAGINAL DELIVERY  2017    Boy    WISDOM TOOTH EXTRACTION       Family History   Problem Relation Age of Onset    Miscarriages / Stillbirths Mother     Miscarriages / Stillbirths Maternal Grandmother     Diabetes Maternal Grandfather     Stroke Maternal Grandfather     Hypertension Maternal Grandfather     Miscarriages / Stillbirths Maternal Aunt     Breast cancer Neg Hx     Colon cancer Neg Hx     Ovarian cancer Neg Hx      labor Neg Hx     Cancer Neg Hx      Social History     Tobacco Use    Smoking status: Former     Types: Cigarettes    Smokeless tobacco: Never   Substance Use Topics    Alcohol use: Yes     Comment: social     Drug use: No     Review of Systems   Constitutional:  Negative for fever.   HENT:  Negative for sore throat.    Eyes:  Negative for photophobia.   Respiratory:  Negative for shortness of breath.    Cardiovascular:  Negative for chest pain.   Gastrointestinal:  Negative for  nausea.   Genitourinary:  Negative for dysuria.   Musculoskeletal:  Negative for back pain.   Skin:  Negative for rash.   Neurological:  Positive for headaches (frontal). Negative for weakness.   Hematological:  Does not bruise/bleed easily.     Physical Exam     Initial Vitals [05/09/23 1815]   BP Pulse Resp Temp SpO2   120/65 68 18 98.3 °F (36.8 °C) 100 %      MAP       --         Physical Exam    Nursing note and vitals reviewed.  Constitutional: Vital signs are normal. She appears well-developed. She is active and cooperative.   HENT:   Head: Normocephalic and atraumatic.   Frontal HA.    Eyes: Conjunctivae, EOM and lids are normal. Pupils are equal, round, and reactive to light.   Neck: Trachea normal. Neck supple. No thyroid mass present.    Full passive range of motion without pain.     Cardiovascular:  Normal rate, regular rhythm, S1 normal, S2 normal, normal heart sounds, intact distal pulses and normal pulses.           Abdominal: Abdomen is soft. Bowel sounds are normal.   Musculoskeletal:         General: Normal range of motion.      Cervical back: Full passive range of motion without pain and neck supple.     Lymphadenopathy:     She has no axillary adenopathy.   Neurological: She is alert and oriented to person, place, and time. She has normal strength and normal reflexes. No cranial nerve deficit or sensory deficit.   Skin: Skin is warm, dry and intact.   Psychiatric: She has a normal mood and affect. Her speech is normal and behavior is normal. Judgment and thought content normal. Cognition and memory are normal.       ED Course   Procedures  Labs Reviewed - No data to display       Imaging Results    None          Medications   ketorolac injection 15 mg (15 mg Intravenous Given 5/9/23 1954)   metoclopramide HCl injection 10 mg (10 mg Intravenous Given 5/9/23 1955)   sodium chloride 0.9% bolus 1,000 mL 1,000 mL (0 mLs Intravenous Stopped 5/9/23 2030)   sodium chloride 0.9% bolus 1,000 mL 1,000 mL (0  mLs Intravenous Stopped 5/9/23 0893)     Medical Decision Making:   History:   Old Medical Records: I decided to obtain old medical records.  ED Management:  Patient had spinal anesthesia for her child birth.  Presents to the emergency department with complaints of headache after having a busy day where she was mostly on her feet.  Patient is given IV fluid resuscitation.  There is no evidence to suggest subarachnoid hemorrhage meningitis nor eclampsia/preeclampsia.  Patient subsequently provided with IV fluid resuscitation and appropriate medications and felt much improved upon discharged from the hospital.  Patient is instructed to take it easy the next couple of days.        Scribe Attestation:   Scribe #1: I performed the above scribed service and the documentation accurately describes the services I performed. I attest to the accuracy of the note.                 This document was produced by a scribe under my direction and in my presence. I agree with the content of the note and have made any necessary edits.     Vijay East MD    05/10/2023 6:36 AM    Clinical Impression:   Final diagnoses:  [G97.1] Spinal puncture headache (Primary)        ED Disposition Condition    Discharge Stable          ED Prescriptions       Medication Sig Dispense Start Date End Date Auth. Provider    naproxen (NAPROSYN) 500 MG tablet Take 1 tablet (500 mg total) by mouth 2 (two) times daily with meals. 20 tablet 5/9/2023 -- Vijay East MD          Follow-up Information       Follow up With Specialties Details Why Contact Info    Your gyn  Schedule an appointment as soon as possible for a visit in 3 days               Vijay East MD  05/10/23 0636

## 2023-05-11 NOTE — ADDENDUM NOTE
Addendum  created 05/11/23 1325 by Stephan Knight MD    Clinical Note Signed, Intraprocedure Blocks edited, SmartForm saved

## 2023-06-02 ENCOUNTER — OFFICE VISIT (OUTPATIENT)
Dept: OBSTETRICS AND GYNECOLOGY | Facility: CLINIC | Age: 23
End: 2023-06-02
Payer: MEDICAID

## 2023-06-02 ENCOUNTER — PATIENT MESSAGE (OUTPATIENT)
Dept: OBSTETRICS AND GYNECOLOGY | Facility: CLINIC | Age: 23
End: 2023-06-02

## 2023-06-02 DIAGNOSIS — F41.8 POSTPARTUM ANXIETY: ICD-10-CM

## 2023-06-02 PROCEDURE — 1160F PR REVIEW ALL MEDS BY PRESCRIBER/CLIN PHARMACIST DOCUMENTED: ICD-10-PCS | Mod: CPTII,95,, | Performed by: OBSTETRICS & GYNECOLOGY

## 2023-06-02 PROCEDURE — 0503F POSTPARTUM CARE VISIT: CPT | Mod: CPTII,95,, | Performed by: OBSTETRICS & GYNECOLOGY

## 2023-06-02 PROCEDURE — 0503F PR POSTPARTUM CARE VISIT: ICD-10-PCS | Mod: CPTII,95,, | Performed by: OBSTETRICS & GYNECOLOGY

## 2023-06-02 PROCEDURE — 1160F RVW MEDS BY RX/DR IN RCRD: CPT | Mod: CPTII,95,, | Performed by: OBSTETRICS & GYNECOLOGY

## 2023-06-02 PROCEDURE — 1159F PR MEDICATION LIST DOCUMENTED IN MEDICAL RECORD: ICD-10-PCS | Mod: CPTII,95,, | Performed by: OBSTETRICS & GYNECOLOGY

## 2023-06-02 PROCEDURE — 1159F MED LIST DOCD IN RCRD: CPT | Mod: CPTII,95,, | Performed by: OBSTETRICS & GYNECOLOGY

## 2023-06-02 RX ORDER — ESCITALOPRAM OXALATE 20 MG/1
20 TABLET ORAL DAILY
Qty: 30 TABLET | Refills: 2 | Status: SHIPPED | OUTPATIENT
Start: 2023-06-02 | End: 2023-08-14

## 2023-06-02 NOTE — PROGRESS NOTES
The patient location is: Outdoors  The chief complaint leading to consultation is: Post Partum Mood Swings    Visit type: audiovisual    Face to Face time with patient: 30 minutes.  35 minutes of total time spent on the encounter, which includes face to face time and non-face to face time preparing to see the patient (eg, review of tests), Obtaining and/or reviewing separately obtained history, Documenting clinical information in the electronic or other health record, Independently interpreting results (not separately reported) and communicating results to the patient/family/caregiver, or Care coordination (not separately reported).         Each patient to whom he or she provides medical services by telemedicine is:  (1) informed of the relationship between the physician and patient and the respective role of any other health care provider with respect to management of the patient; and (2) notified that he or she may decline to receive medical services by telemedicine and may withdraw from such care at any time.    Notes:     Chief Complaint: Mood Swings Post Partum     HPI:      Rina Keene is a 23 y.o.  who presents today for discussion of anxiety and depression post partum. Wakes up in the morning and feels really good - but as the day goes on she feels more and more depressed. Hears her son crying and finds it hard to get up to go to him. Wakes from sleep with anxiety about his wellbeing. She is currently breast feeding. [place] PPD scale score: 16   Denies SI, HI.    Physical Exam:     APPEARANCE: Well nourished, well developed, in no acute distress.    Assessment/Plan:     Post partum depression  Postpartum anxiety  -     EScitalopram oxalate (LEXAPRO) 20 MG tablet; Take 1 tablet (20 mg total) by mouth once daily.  Dispense: 30 tablet; Refill: 2        -      mental health specialist info sent to patient.         -     ER precautions reviewed.         -     Will check on patient in 1  week for mood update

## 2023-06-09 ENCOUNTER — PATIENT MESSAGE (OUTPATIENT)
Dept: OBSTETRICS AND GYNECOLOGY | Facility: CLINIC | Age: 23
End: 2023-06-09
Payer: MEDICAID

## 2023-06-16 ENCOUNTER — POSTPARTUM VISIT (OUTPATIENT)
Dept: OBSTETRICS AND GYNECOLOGY | Facility: CLINIC | Age: 23
End: 2023-06-16
Payer: MEDICAID

## 2023-06-16 VITALS
HEIGHT: 60 IN | SYSTOLIC BLOOD PRESSURE: 112 MMHG | BODY MASS INDEX: 22.33 KG/M2 | WEIGHT: 113.75 LBS | DIASTOLIC BLOOD PRESSURE: 72 MMHG

## 2023-06-16 DIAGNOSIS — Z30.09 ENCOUNTER FOR OTHER GENERAL COUNSELING AND ADVICE ON CONTRACEPTION: ICD-10-CM

## 2023-06-16 PROCEDURE — 0503F PR POSTPARTUM CARE VISIT: ICD-10-PCS | Mod: CPTII,,, | Performed by: OBSTETRICS & GYNECOLOGY

## 2023-06-16 PROCEDURE — 99999 PR PBB SHADOW E&M-EST. PATIENT-LVL III: CPT | Mod: PBBFAC,,, | Performed by: OBSTETRICS & GYNECOLOGY

## 2023-06-16 PROCEDURE — 99213 OFFICE O/P EST LOW 20 MIN: CPT | Mod: PBBFAC | Performed by: OBSTETRICS & GYNECOLOGY

## 2023-06-16 PROCEDURE — 99999 PR PBB SHADOW E&M-EST. PATIENT-LVL III: ICD-10-PCS | Mod: PBBFAC,,, | Performed by: OBSTETRICS & GYNECOLOGY

## 2023-06-16 PROCEDURE — 0503F POSTPARTUM CARE VISIT: CPT | Mod: CPTII,,, | Performed by: OBSTETRICS & GYNECOLOGY

## 2023-06-16 RX ORDER — MAGNESIUM 250 MG
TABLET ORAL ONCE
COMMUNITY
End: 2023-11-03

## 2023-06-16 RX ORDER — ACETAMINOPHEN AND CODEINE PHOSPHATE 120; 12 MG/5ML; MG/5ML
1 SOLUTION ORAL DAILY
Qty: 84 TABLET | Refills: 3 | Status: SHIPPED | OUTPATIENT
Start: 2023-06-16 | End: 2023-11-03 | Stop reason: SDUPTHER

## 2023-06-16 NOTE — PROGRESS NOTES
Subjective:      Rina Keene is a 23 y.o.  who presents for a postpartum visit.  She is status post  uncomplicated vaginal delivery 6 weeks ago.  Her hospitalization was not complicated but post partum course has been complicated by post-partum depression. She is currently taking Lexapro 20 mg daily.  She is breastfeeding.  She desires oral progesterone-only contraceptive for contraception.  She reports her post partum depression symptoms are gradually getting better. Score today 11 down from 16. Has two follow up calls waiting for psychologists.    Her last pap was NILM on 3/28/2020      Review the Delivery Report for details.     Objective:     /72   Ht 5' (1.524 m)   Wt 51.6 kg (113 lb 12.1 oz)   LMP 2022   Breastfeeding Yes   BMI 22.22 kg/m²     General: healthy, no distress  Abdomen: soft, benign  External Genitalia: normal, well-healed, without lesions or masses  Vagina: normal, well-healed, physiologic discharge, without lesions    Assessment:     Postpartum care and examination    Encounter for other general counseling and advice on contraception  -     norethindrone (ORTHO MICRONOR) 0.35 mg tablet; Take 1 tablet (0.35 mg total) by mouth once daily.  Dispense: 84 tablet; Refill: 3        Plan:     1. Return to clinic in 3-4 months for annual exam

## 2023-08-07 PROBLEM — O42.92 FULL-TERM PREMATURE RUPTURE OF MEMBRANES: Status: RESOLVED | Noted: 2023-05-05 | Resolved: 2023-08-07

## 2023-08-12 DIAGNOSIS — F41.8 POSTPARTUM ANXIETY: ICD-10-CM

## 2023-08-13 NOTE — TELEPHONE ENCOUNTER
Refill Routing Note   Medication(s) are not appropriate for processing by [facility] for the following reason(s):      New or recently adjusted medication    [facility] action(s):  Defer Care Due:  None identified          Appointments  past 12m or future 3m with PCP    Date Provider   Last Visit   6/2/2023 Jacqueline Perez MD   Next Visit   9/20/2023 Jacqueline Perez MD   ED visits in past 90 days: 0        Note composed:12:25 PM 08/13/2023

## 2023-08-14 RX ORDER — ESCITALOPRAM OXALATE 20 MG/1
20 TABLET ORAL
Qty: 90 TABLET | Refills: 3 | Status: SHIPPED | OUTPATIENT
Start: 2023-08-14

## 2023-10-19 ENCOUNTER — NURSE TRIAGE (OUTPATIENT)
Dept: ADMINISTRATIVE | Facility: CLINIC | Age: 23
End: 2023-10-19
Payer: MEDICAID

## 2023-10-19 ENCOUNTER — PATIENT MESSAGE (OUTPATIENT)
Dept: OBSTETRICS AND GYNECOLOGY | Facility: CLINIC | Age: 23
End: 2023-10-19
Payer: MEDICAID

## 2023-10-19 NOTE — TELEPHONE ENCOUNTER
Pt accidentally drank out of someone else's bottle of water. Pt stated she didn't swallow. Pt stated she is a breast feeding mom and would like to know what to do. Pt denies any complications at this time. Pt wants to know if there is anything she needs to do. Please call and advise mom. Care advice recommends pt call md within 24 hours.   Reason for Disposition   [1] Follow-up call from patient regarding patient's clinical status AND [2] information NON-URGENT    Protocols used: PCP Call - No Triage-A-

## 2023-10-19 NOTE — TELEPHONE ENCOUNTER
Reason for Disposition   Nursing judgment    Protocols used: Information Only Call - No Triage-A-[place]

## 2023-10-19 NOTE — TELEPHONE ENCOUNTER
[facility] Triage queDr. 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  Denies any symptoms.   Patient has no symptoms.  Should she be doing anything?  For any new symptoms to callback.  Sent message to   To reach out to patient directly.  Care dispo is to hearback from specialist OB or Rn within 1 hour.   Reason for Disposition   No answer.  First attempt to contact caller.  Follow-up call scheduled within 15 minutes.    Protocols used: No Contact or Duplicate Contact Call-A-[place]

## 2023-10-23 ENCOUNTER — OFFICE VISIT (OUTPATIENT)
Dept: URGENT CARE | Facility: CLINIC | Age: 23
End: 2023-10-23
Payer: MEDICAID

## 2023-10-23 VITALS
TEMPERATURE: 99 F | HEIGHT: 60 IN | BODY MASS INDEX: 22.19 KG/M2 | OXYGEN SATURATION: 100 % | DIASTOLIC BLOOD PRESSURE: 60 MMHG | SYSTOLIC BLOOD PRESSURE: 100 MMHG | WEIGHT: 113 LBS | RESPIRATION RATE: 16 BRPM | HEART RATE: 110 BPM

## 2023-10-23 DIAGNOSIS — J30.1 SEASONAL ALLERGIC RHINITIS DUE TO POLLEN: ICD-10-CM

## 2023-10-23 DIAGNOSIS — J02.9 SORE THROAT: ICD-10-CM

## 2023-10-23 DIAGNOSIS — J06.9 VIRAL URI WITH COUGH: Primary | ICD-10-CM

## 2023-10-23 LAB
CTP QC/QA: YES
CTP QC/QA: YES
MOLECULAR STREP A: NEGATIVE
SARS-COV-2 AG RESP QL IA.RAPID: NEGATIVE

## 2023-10-23 PROCEDURE — 87651 STREP A DNA AMP PROBE: CPT | Mod: QW,S$GLB,, | Performed by: FAMILY MEDICINE

## 2023-10-23 PROCEDURE — 87811 SARS CORONAVIRUS 2 ANTIGEN POCT, MANUAL READ: ICD-10-PCS | Mod: QW,S$GLB,, | Performed by: FAMILY MEDICINE

## 2023-10-23 PROCEDURE — 99213 OFFICE O/P EST LOW 20 MIN: CPT | Mod: S$GLB,,, | Performed by: FAMILY MEDICINE

## 2023-10-23 PROCEDURE — 87811 SARS-COV-2 COVID19 W/OPTIC: CPT | Mod: QW,S$GLB,, | Performed by: FAMILY MEDICINE

## 2023-10-23 PROCEDURE — 87651 POCT STREP A MOLECULAR: ICD-10-PCS | Mod: QW,S$GLB,, | Performed by: FAMILY MEDICINE

## 2023-10-23 PROCEDURE — 99213 PR OFFICE/OUTPT VISIT, EST, LEVL III, 20-29 MIN: ICD-10-PCS | Mod: S$GLB,,, | Performed by: FAMILY MEDICINE

## 2023-10-23 RX ORDER — FLUTICASONE PROPIONATE 50 MCG
2 SPRAY, SUSPENSION (ML) NASAL DAILY
Qty: 9.9 ML | Refills: 0 | Status: SHIPPED | OUTPATIENT
Start: 2023-10-23

## 2023-10-23 RX ORDER — PROMETHAZINE HYDROCHLORIDE AND DEXTROMETHORPHAN HYDROBROMIDE 6.25; 15 MG/5ML; MG/5ML
5 SYRUP ORAL EVERY 8 HOURS PRN
Qty: 180 ML | Refills: 0 | Status: SHIPPED | OUTPATIENT
Start: 2023-10-23 | End: 2023-11-03

## 2023-10-23 RX ORDER — PREDNISONE 20 MG/1
20 TABLET ORAL DAILY
Qty: 3 TABLET | Refills: 0 | Status: SHIPPED | OUTPATIENT
Start: 2023-10-23 | End: 2023-10-26

## 2023-10-23 NOTE — PROGRESS NOTES
Subjective:      Patient ID: Rina Keene is a 23 y.o. female.    Vitals:  height is 5' (1.524 m) and weight is 51.3 kg (113 lb). Her temperature is 98.5 °F (36.9 °C). Her blood pressure is 100/60 and her pulse is 110. Her respiration is 16 and oxygen saturation is 100%.     Chief Complaint: Sore Throat    23 year old female presents today with Close exposure to Strep and a URI. Symptoms started 10/21/2023. Cough, post nasal drip, earache, sore throat, sputum production white color. Treatments at home includes Motrin. Positive COVID 2020. Not vaccinated for COVID.     Currently breastfeeding.     Sore Throat   This is a new problem. The current episode started in the past 7 days. The problem has been gradually worsening. There has been no fever. The pain is at a severity of 3/10. Associated symptoms include coughing, ear pain and swollen glands. She has had no exposure to strep or mono. She has tried NSAIDs for the symptoms. The treatment provided no relief.     HENT:  Positive for ear pain and sore throat.    Respiratory:  Positive for cough.       Objective:     Physical Exam   Constitutional: She is oriented to person, place, and time. She appears well-developed. She is cooperative.  Non-toxic appearance. She does not appear ill. No distress.   HENT:   Head: Normocephalic and atraumatic.   Ears:   Right Ear: Hearing, tympanic membrane and external ear normal.   Left Ear: Hearing, tympanic membrane and external ear normal.   Nose: Nose normal. No mucosal edema, rhinorrhea or nasal deformity. No epistaxis. Right sinus exhibits no maxillary sinus tenderness and no frontal sinus tenderness. Left sinus exhibits no maxillary sinus tenderness and no frontal sinus tenderness.   Mouth/Throat: Uvula is midline, oropharynx is clear and moist and mucous membranes are normal. No trismus in the jaw. Normal dentition. No uvula swelling. No oropharyngeal exudate, posterior oropharyngeal edema or posterior oropharyngeal  erythema.   Eyes: Conjunctivae and lids are normal. No scleral icterus.   Neck: Trachea normal and phonation normal. Neck supple. No edema present. No erythema present. No neck rigidity present.   Cardiovascular: Normal rate, regular rhythm, normal heart sounds and normal pulses.   Pulmonary/Chest: Effort normal and breath sounds normal. No respiratory distress. She has no decreased breath sounds. She has no rhonchi.   Abdominal: Normal appearance.   Musculoskeletal: Normal range of motion.         General: No deformity. Normal range of motion.   Neurological: She is alert and oriented to person, place, and time. She exhibits normal muscle tone. Coordination normal.   Skin: Skin is warm, dry, intact, not diaphoretic and not pale.   Psychiatric: Her speech is normal and behavior is normal. Judgment and thought content normal.   Nursing note and vitals reviewed.      Assessment:     1. Viral URI with cough    2. Sore throat    3. Seasonal allergic rhinitis due to pollen        Plan:       Viral URI with cough  -     predniSONE (DELTASONE) 20 MG tablet; Take 1 tablet (20 mg total) by mouth once daily. for 3 days  Dispense: 3 tablet; Refill: 0  -     promethazine-dextromethorphan (PROMETHAZINE-DM) 6.25-15 mg/5 mL Syrp; Take 5 mLs by mouth every 8 (eight) hours as needed.  Dispense: 180 mL; Refill: 0    Sore throat  -     SARS Coronavirus 2 Antigen, POCT Manual Read  -     POCT Strep A, Molecular    Seasonal allergic rhinitis due to pollen  -     fluticasone propionate (FLONASE) 50 mcg/actuation nasal spray; 2 sprays (100 mcg total) by Each Nostril route once daily.  Dispense: 9.9 mL; Refill: 0  -     promethazine-dextromethorphan (PROMETHAZINE-DM) 6.25-15 mg/5 mL Syrp; Take 5 mLs by mouth every 8 (eight) hours as needed.  Dispense: 180 mL; Refill: 0

## 2023-11-03 ENCOUNTER — OFFICE VISIT (OUTPATIENT)
Dept: OBSTETRICS AND GYNECOLOGY | Facility: CLINIC | Age: 23
End: 2023-11-03
Payer: MEDICAID

## 2023-11-03 VITALS
BODY MASS INDEX: 21.93 KG/M2 | WEIGHT: 111.69 LBS | HEIGHT: 60 IN | DIASTOLIC BLOOD PRESSURE: 60 MMHG | SYSTOLIC BLOOD PRESSURE: 110 MMHG

## 2023-11-03 DIAGNOSIS — Z30.41 ORAL CONTRACEPTIVE PILL SURVEILLANCE: ICD-10-CM

## 2023-11-03 DIAGNOSIS — Z12.4 SCREENING FOR CERVICAL CANCER: Primary | ICD-10-CM

## 2023-11-03 PROCEDURE — 3008F PR BODY MASS INDEX (BMI) DOCUMENTED: ICD-10-PCS | Mod: CPTII,,, | Performed by: OBSTETRICS & GYNECOLOGY

## 2023-11-03 PROCEDURE — 88175 CYTOPATH C/V AUTO FLUID REDO: CPT | Performed by: OBSTETRICS & GYNECOLOGY

## 2023-11-03 PROCEDURE — 99999 PR PBB SHADOW E&M-EST. PATIENT-LVL III: ICD-10-PCS | Mod: PBBFAC,,, | Performed by: OBSTETRICS & GYNECOLOGY

## 2023-11-03 PROCEDURE — 99395 PR PREVENTIVE VISIT,EST,18-39: ICD-10-PCS | Mod: S$PBB,,, | Performed by: OBSTETRICS & GYNECOLOGY

## 2023-11-03 PROCEDURE — 3074F SYST BP LT 130 MM HG: CPT | Mod: CPTII,,, | Performed by: OBSTETRICS & GYNECOLOGY

## 2023-11-03 PROCEDURE — 1159F PR MEDICATION LIST DOCUMENTED IN MEDICAL RECORD: ICD-10-PCS | Mod: CPTII,,, | Performed by: OBSTETRICS & GYNECOLOGY

## 2023-11-03 PROCEDURE — 1160F RVW MEDS BY RX/DR IN RCRD: CPT | Mod: CPTII,,, | Performed by: OBSTETRICS & GYNECOLOGY

## 2023-11-03 PROCEDURE — 1160F PR REVIEW ALL MEDS BY PRESCRIBER/CLIN PHARMACIST DOCUMENTED: ICD-10-PCS | Mod: CPTII,,, | Performed by: OBSTETRICS & GYNECOLOGY

## 2023-11-03 PROCEDURE — 1159F MED LIST DOCD IN RCRD: CPT | Mod: CPTII,,, | Performed by: OBSTETRICS & GYNECOLOGY

## 2023-11-03 PROCEDURE — 3008F BODY MASS INDEX DOCD: CPT | Mod: CPTII,,, | Performed by: OBSTETRICS & GYNECOLOGY

## 2023-11-03 PROCEDURE — 99395 PREV VISIT EST AGE 18-39: CPT | Mod: S$PBB,,, | Performed by: OBSTETRICS & GYNECOLOGY

## 2023-11-03 PROCEDURE — 99999 PR PBB SHADOW E&M-EST. PATIENT-LVL III: CPT | Mod: PBBFAC,,, | Performed by: OBSTETRICS & GYNECOLOGY

## 2023-11-03 PROCEDURE — 3074F PR MOST RECENT SYSTOLIC BLOOD PRESSURE < 130 MM HG: ICD-10-PCS | Mod: CPTII,,, | Performed by: OBSTETRICS & GYNECOLOGY

## 2023-11-03 PROCEDURE — 3078F PR MOST RECENT DIASTOLIC BLOOD PRESSURE < 80 MM HG: ICD-10-PCS | Mod: CPTII,,, | Performed by: OBSTETRICS & GYNECOLOGY

## 2023-11-03 PROCEDURE — 99213 OFFICE O/P EST LOW 20 MIN: CPT | Mod: PBBFAC | Performed by: OBSTETRICS & GYNECOLOGY

## 2023-11-03 PROCEDURE — 3078F DIAST BP <80 MM HG: CPT | Mod: CPTII,,, | Performed by: OBSTETRICS & GYNECOLOGY

## 2023-11-03 RX ORDER — ACETAMINOPHEN AND CODEINE PHOSPHATE 120; 12 MG/5ML; MG/5ML
1 SOLUTION ORAL DAILY
Qty: 84 TABLET | Refills: 3 | Status: SHIPPED | OUTPATIENT
Start: 2023-11-03 | End: 2024-11-02

## 2023-11-03 NOTE — PROGRESS NOTES
Chief Complaint: Well Woman Exam     HPI:      Rina Keene is a 23 y.o.  who presents today for well woman exam.  LMP: No LMP recorded (lmp unknown).  No issues, problems, or complaints. Specifically, patient denies abnormal vaginal bleeding, discharge, pelvic pain, urinary problems, or changes in appetite. Ms. Keene is currently sexually active with a single male partner. She is currently using oral progesterone-only contraceptive for contraception. She declines STD screening today.    Previous Pap: NILM (2020)    Gardasil:Completed     Ms. Keene confirms that she wears her seatbelt when riding in the car and does not text while driving.     OB History          2    Para   2    Term   2       0    AB   0    Living   2         SAB   0    IAB   0    Ectopic   0    Multiple   0    Live Births   2           Obstetric Comments   Menarche ~10             ROS:     GENERAL: Denies unintentional weight gain or weight loss. Feeling well overall.   SKIN: Denies rash or lesions.   HEENT: Denies headaches, or vision changes.   CARDIOVASCULAR: Denies palpitations or chest pain.   RESPIRATORY: Denies shortness of breath or dyspnea on exertion.  BREASTS: Denies lumps or nipple discharge.   ABDOMEN: Denies constipation, diarrhea, nausea, vomiting, change in appetite.  URINARY: Denies frequency, dysuria.  NEUROLOGIC: Denies syncope or weakness.   PSYCHIATRIC: Denies uncontrolled depression or anxiety.    Physical Exam:      Physical Exam     /60   Ht 5' (1.524 m)   Wt 50.7 kg (111 lb 10.6 oz)   LMP  (LMP Unknown)   Breastfeeding Yes   BMI 21.81 kg/m²   Body mass index is 21.81 kg/m².     APPEARANCE: Well nourished, well developed, in no acute distress.  PSYCH: Appropriate mood and affect.  SKIN: No acne or hirsutism  NECK: Neck symmetric without masses  NODES: No inguinal, axillary, or supraclavicular lymph node enlargement  ABDOMEN: Soft.  No tenderness or masses.    CARDIOVASCULAR:  No edema of peripheral extremities  BREASTS: Symmetrical, no visible skin lesions. No palpable masses. No nipple discharge bilaterally.  PELVIC: Normal external genitalia without lesions.  Normal hair distribution.  Adequate perineal body, normal urethral meatus.  Vagina moist and well rugated. Without lesions. Vagina without abnormal discharge.  Cervix without lesions, abnormal discharge, or tenderness.. No significant cystocele or rectocele.  Bimanual exam shows uterus to be normal size, regular, mobile and nontender.  Adnexa without masses or tenderness.      Assessment/Plan:     Screening for cervical cancer  -     Liquid-Based Pap Smear, Screening    Oral contraceptive pill surveillance  -     norethindrone (ORTHO MICRONOR) 0.35 mg tablet; Take 1 tablet (0.35 mg total) by mouth once daily.  Dispense: 84 tablet; Refill: 3        Follow up in about 1 year (around 11/3/2024) for Annual Exam.    Counseling:     Patient was counseled today on current ASCCP pap guidelines, the recommendation for yearly physical exams, safe driving habits, breast self awareness and annual mammograms. She is to see her PCP for other health maintenance.     Use of the [facility] Patient Portal discussed and encouraged during today's visit.

## 2023-11-11 LAB
FINAL PATHOLOGIC DIAGNOSIS: NORMAL
Lab: NORMAL

## 2024-02-11 ENCOUNTER — OFFICE VISIT (OUTPATIENT)
Dept: URGENT CARE | Facility: CLINIC | Age: 24
End: 2024-02-11
Payer: MEDICAID

## 2024-02-11 VITALS
WEIGHT: 110 LBS | HEIGHT: 59 IN | OXYGEN SATURATION: 96 % | TEMPERATURE: 98 F | SYSTOLIC BLOOD PRESSURE: 101 MMHG | HEART RATE: 98 BPM | DIASTOLIC BLOOD PRESSURE: 67 MMHG | BODY MASS INDEX: 22.18 KG/M2

## 2024-02-11 DIAGNOSIS — R05.9 COUGH, UNSPECIFIED TYPE: Primary | ICD-10-CM

## 2024-02-11 DIAGNOSIS — J40 BRONCHITIS: ICD-10-CM

## 2024-02-11 DIAGNOSIS — J32.9 SINUSITIS, UNSPECIFIED CHRONICITY, UNSPECIFIED LOCATION: ICD-10-CM

## 2024-02-11 PROCEDURE — 99214 OFFICE O/P EST MOD 30 MIN: CPT | Mod: S$GLB,,, | Performed by: NURSE PRACTITIONER

## 2024-02-11 RX ORDER — ALBUTEROL SULFATE 90 UG/1
2 AEROSOL, METERED RESPIRATORY (INHALATION) EVERY 6 HOURS PRN
Qty: 18 G | Refills: 0 | Status: SHIPPED | OUTPATIENT
Start: 2024-02-11

## 2024-02-11 RX ORDER — DEXAMETHASONE SODIUM PHOSPHATE 4 MG/ML
4 INJECTION, SOLUTION INTRA-ARTICULAR; INTRALESIONAL; INTRAMUSCULAR; INTRAVENOUS; SOFT TISSUE
Status: COMPLETED | OUTPATIENT
Start: 2024-02-11 | End: 2024-02-11

## 2024-02-11 RX ADMIN — DEXAMETHASONE SODIUM PHOSPHATE 4 MG: 4 INJECTION, SOLUTION INTRA-ARTICULAR; INTRALESIONAL; INTRAMUSCULAR; INTRAVENOUS; SOFT TISSUE at 10:02

## 2024-02-11 NOTE — PROGRESS NOTES
"Subjective:      Patient ID: Rina Keene is a 23 y.o. female.    Vitals:  height is 4' 11" (1.499 m) and weight is 49.9 kg (110 lb). Her oral temperature is 98.3 °F (36.8 °C). Her blood pressure is 101/67 and her pulse is 98. Her oxygen saturation is 96%.     Chief Complaint: Cough    Cough  This is a new problem. The current episode started 1 to 4 weeks ago (10 days ago). Associated symptoms include ear congestion. Pertinent negatives include no fever or shortness of breath. Associated symptoms comments: Hoarse voice. Treatments tried: ibuprofen. The treatment provided no relief.       Constitution: Negative for fever.   Respiratory:  Positive for cough. Negative for shortness of breath.       Objective:     Past Medical History:   Diagnosis Date    Asthma, currently inactive     seasonal    Scoliosis        Past Surgical History:   Procedure Laterality Date    VAGINAL DELIVERY  2017    Boy    WISDOM TOOTH EXTRACTION         Family History   Problem Relation Age of Onset    Miscarriages / Stillbirths Mother     Miscarriages / Stillbirths Maternal Grandmother     Diabetes Maternal Grandfather     Stroke Maternal Grandfather     Hypertension Maternal Grandfather     Miscarriages / Stillbirths Maternal Aunt     Breast cancer Neg Hx     Colon cancer Neg Hx     Ovarian cancer Neg Hx      labor Neg Hx     Cancer Neg Hx        Social History     Socioeconomic History    Marital status: Single   Tobacco Use    Smoking status: Former     Types: Cigarettes    Smokeless tobacco: Never   Substance and Sexual Activity    Alcohol use: Yes     Comment: social     Drug use: No    Sexual activity: Yes     Partners: Male     Birth control/protection: Condom     Comment: Single:    Social History Narrative    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 84 tablet 3    " albuterol (PROVENTIL/VENTOLIN HFA) 90 mcg/actuation inhaler Inhale 2 puffs into the lungs every 6 (six) hours as needed for Wheezing. Rescue 18 g 0    EScitalopram oxalate (LEXAPRO) 20 MG tablet TAKE 1 TABLET BY MOUTH ONCE DAILY (Patient not taking: Reported on 10/23/2023) 90 tablet 3    fluticasone propionate (FLONASE) 50 mcg/actuation nasal spray 2 sprays by Each Nostril route as needed.      fluticasone propionate (FLONASE) 50 mcg/actuation nasal spray 2 sprays (100 mcg total) by Each Nostril route once daily. 9.9 mL 0    ibuprofen (ADVIL,MOTRIN) 600 MG tablet Take 1 tablet (600 mg total) by mouth every 6 (six) hours as needed (cramping). (Patient not taking: Reported on 2/11/2024) 60 tablet 0    iron bis-gly/FA/C/B12/Ca/succ (IRON-150 ORAL) Take by mouth.      prenatal 25/iron fum/folic/dha (PRENATAL-1 ORAL) Take by mouth.       Current Facility-Administered Medications   Medication Dose Route Frequency Provider Last Rate Last Admin    dexAMETHasone injection 4 mg  4 mg Intramuscular 1 time in Clinic/HOD Cruz Chavez NP           Review of patient's allergies indicates:  No Known Allergies    Physical Exam   Constitutional: She is oriented to person, place, and time.   HENT:   Head: Normocephalic.   Ears:   Right Ear: Tympanic membrane and ear canal normal.   Left Ear: Tympanic membrane and ear canal normal.   Eyes: Conjunctivae are normal.   Cardiovascular: Normal rate.   Pulmonary/Chest: Effort normal and breath sounds normal.   Abdominal: Normal appearance.   Musculoskeletal: Normal range of motion.         General: Normal range of motion.   Neurological: She is alert and oriented to person, place, and time.   Skin: Skin is no rash.   Psychiatric: Her behavior is normal. Mood, judgment and thought content normal.   Nursing note and vitals reviewed.      Assessment:     1. Cough, unspecified type    2. Sinusitis, unspecified chronicity, unspecified location        Plan:       Cough, unspecified  type    Sinusitis, unspecified chronicity, unspecified location    Other orders  -     dexAMETHasone injection 4 mg  -     albuterol (PROVENTIL/VENTOLIN HFA) 90 mcg/actuation inhaler; Inhale 2 puffs into the lungs every 6 (six) hours as needed for Wheezing. Rescue  Dispense: 18 g; Refill: 0    Pt presents after approx one week sinus congestion, sore throat now non-productive cough. She has had other ill contacts with family. On exam no evidence of OM, bacterial pharyngitis and lungs clear, doubt pneumonia. Will treat for bronchitis with IM steroid and Albuterol. Discussed Claritin OTC and Return precautions given.

## 2024-02-12 ENCOUNTER — TELEPHONE (OUTPATIENT)
Dept: OBSTETRICS AND GYNECOLOGY | Facility: CLINIC | Age: 24
End: 2024-02-12
Payer: MEDICAID

## 2024-02-12 NOTE — TELEPHONE ENCOUNTER
Dr Perez pt calling, has a bad cough went to Urgent care but needs to know what she can take cause she is breastfeeding. Pt # [phone]

## 2024-06-20 ENCOUNTER — PATIENT MESSAGE (OUTPATIENT)
Dept: OBSTETRICS AND GYNECOLOGY | Facility: CLINIC | Age: 24
End: 2024-06-20
Payer: MEDICAID

## 2024-06-20 RX ORDER — NORGESTIMATE AND ETHINYL ESTRADIOL 0.25-0.035
1 KIT ORAL DAILY
Qty: 84 TABLET | Refills: 3 | Status: SHIPPED | OUTPATIENT
Start: 2024-06-20

## 2024-10-26 NOTE — LETTER
May 2, 2017      [facility]  [address], [address]  [facility] [address]  Phone: [phone]  Fax: [phone]       Patient: Rina Arce Ponthieux   YOB: 2000  Date of Visit: 05/02/2017    To Whom It May Concern:    Rina Arce was at [facility] on 05/02/2017. She needs to be excused from work for 5/1/2017 and today. She may return to work/school on 5/3/17 with no restrictions. If you have any questions or concerns, or if I can be of further assistance, please do not hesitate to contact me.    Sincerely,    Jacqueline Perez MD      Given IV thiamine and D5 normal saline infusion in ED.  Will continue D5 NS at 150 cc per hour  Repeat BMP, lactic acid, acetone in the morning  Clear liquid diet, advance as tolerated

## 2025-02-10 RX ORDER — NORGESTIMATE AND ETHINYL ESTRADIOL 0.25-0.035
1 KIT ORAL
Qty: 84 TABLET | Refills: 0 | Status: SHIPPED | OUTPATIENT
Start: 2025-02-10

## 2025-02-10 NOTE — TELEPHONE ENCOUNTER
Refill Routing Note   Medication(s) are not appropriate for processing by [facility] for the following reason(s):        Patient not seen by provider within 15 months    [facility] action(s):  Defer               Appointments  past 12m or future 3m with PCP    Date Provider   Last Visit   11/3/2023 Jacqueline Perez MD   Next Visit   Visit date not found Jacqueline Perez MD   ED visits in past 90 days: 0        Note composed:12:44 PM 02/10/2025

## 2025-05-31 ENCOUNTER — NURSE TRIAGE (OUTPATIENT)
Dept: ADMINISTRATIVE | Facility: CLINIC | Age: 25
End: 2025-05-31
Payer: MEDICAID

## 2025-05-31 NOTE — TELEPHONE ENCOUNTER
Pt called with wanting information about sharing drinks with a friend and that she then told her that she had some sort of infection thing going on but wouldn't tell her and pt is concerned. Pt told that would be something she would need to talk to provider and she would need to get answers from friend as to what she actually has. Pt doesn't have a provider with us as PCP and told to do UC or VV to discuss with a care team provider but really needs more answers as to what this friend has. Pt to call back if any other questions or concerns                   Reason for Disposition   [1] Caller requesting NON-URGENT health information AND [2] PCP's office is the best resource    Protocols used: Information Only Call - No Triage-A-

## 2025-06-02 ENCOUNTER — TELEPHONE (OUTPATIENT)
Dept: OBSTETRICS AND GYNECOLOGY | Facility: CLINIC | Age: 25
End: 2025-06-02
Payer: MEDICAID

## 2025-06-04 ENCOUNTER — OFFICE VISIT (OUTPATIENT)
Dept: OBSTETRICS AND GYNECOLOGY | Facility: CLINIC | Age: 25
End: 2025-06-04
Payer: MEDICAID

## 2025-06-04 VITALS
HEIGHT: 59 IN | DIASTOLIC BLOOD PRESSURE: 56 MMHG | BODY MASS INDEX: 22.23 KG/M2 | SYSTOLIC BLOOD PRESSURE: 100 MMHG | WEIGHT: 110.25 LBS

## 2025-06-04 DIAGNOSIS — F41.9 ANXIETY: ICD-10-CM

## 2025-06-04 DIAGNOSIS — Z01.419 ENCOUNTER FOR WELL WOMAN EXAM WITH ROUTINE GYNECOLOGICAL EXAM: Primary | ICD-10-CM

## 2025-06-04 DIAGNOSIS — Z30.41 ENCOUNTER FOR BIRTH CONTROL PILLS MAINTENANCE: ICD-10-CM

## 2025-06-04 PROCEDURE — 3008F BODY MASS INDEX DOCD: CPT | Mod: CPTII,,,

## 2025-06-04 PROCEDURE — 1159F MED LIST DOCD IN RCRD: CPT | Mod: CPTII,,,

## 2025-06-04 PROCEDURE — 3078F DIAST BP <80 MM HG: CPT | Mod: CPTII,,,

## 2025-06-04 PROCEDURE — 3074F SYST BP LT 130 MM HG: CPT | Mod: CPTII,,,

## 2025-06-04 PROCEDURE — 99395 PREV VISIT EST AGE 18-39: CPT | Mod: S$PBB,,,

## 2025-06-04 PROCEDURE — 99212 OFFICE O/P EST SF 10 MIN: CPT | Mod: PBBFAC

## 2025-06-04 PROCEDURE — 99999 PR PBB SHADOW E&M-EST. PATIENT-LVL II: CPT | Mod: PBBFAC,,,

## 2025-06-04 RX ORDER — NORGESTIMATE AND ETHINYL ESTRADIOL 0.25-0.035
1 KIT ORAL DAILY
Qty: 84 TABLET | Refills: 3 | Status: SHIPPED | OUTPATIENT
Start: 2025-06-04

## 2025-06-04 RX ORDER — ESCITALOPRAM OXALATE 10 MG/1
10 TABLET ORAL DAILY
Qty: 30 TABLET | Refills: 2 | Status: SHIPPED | OUTPATIENT
Start: 2025-06-04 | End: 2026-06-04